# Patient Record
Sex: FEMALE | Race: WHITE
[De-identification: names, ages, dates, MRNs, and addresses within clinical notes are randomized per-mention and may not be internally consistent; named-entity substitution may affect disease eponyms.]

---

## 2017-04-14 ENCOUNTER — HOSPITAL ENCOUNTER (OUTPATIENT)
Dept: HOSPITAL 47 - RADMAMWWP | Age: 57
Discharge: HOME | End: 2017-04-14
Payer: COMMERCIAL

## 2017-04-14 DIAGNOSIS — R92.8: Primary | ICD-10-CM

## 2017-04-17 NOTE — MM
Reason for exam: follow-up at short interval from prior study.

Last mammogram was performed 6 months ago.



History:

Patient is postmenopausal.

Taking estrogen for 6 years 1 month.



Physical Findings:

Nurse did not find any significant physical abnormalities on exam.



MG Diagnostic Mammo w CAD [facility]

Bilateral CC and MLO view(s) were taken.

Prior study comparison: October 10, 2016, bilateral MG screening mammo w CAD.  

September 16, 2015, bilateral MG screening mammo w CAD.

The breast tissue is heterogeneously dense. This may lower the sensitivity of 

mammography.  No significant new findings when compared with previous films.



These results were verbally communicated with the patient and result sheet given 

to the patient on 04/14/17.





ASSESSMENT: Benign, BI-RAD 2



RECOMMENDATION:

Routine screening mammogram of both breasts in 1 year.

## 2017-10-18 ENCOUNTER — HOSPITAL ENCOUNTER (OUTPATIENT)
Dept: HOSPITAL 47 - LABWHC1 | Age: 57
Discharge: HOME | End: 2017-10-18
Attending: PSYCHIATRY & NEUROLOGY
Payer: MEDICARE

## 2017-10-18 ENCOUNTER — HOSPITAL ENCOUNTER (OUTPATIENT)
Dept: HOSPITAL 47 - WWCWWP | Age: 57
End: 2017-10-18
Payer: MEDICARE

## 2017-10-18 DIAGNOSIS — Z79.899: ICD-10-CM

## 2017-10-18 DIAGNOSIS — Z01.419: Primary | ICD-10-CM

## 2017-10-18 DIAGNOSIS — Z51.81: Primary | ICD-10-CM

## 2017-10-18 LAB
CHOLEST SERPL-MCNC: 199 MG/DL (ref ?–200)
GLUCOSE SERPL-MCNC: 121 MG/DL (ref 74–99)
HDLC SERPL-MCNC: 47 MG/DL (ref 40–60)
HEMOGLOBIN A1C: 6.4 % (ref 4.2–6.1)

## 2017-10-18 PROCEDURE — 84443 ASSAY THYROID STIM HORMONE: CPT

## 2017-10-18 PROCEDURE — 82947 ASSAY GLUCOSE BLOOD QUANT: CPT

## 2017-10-18 PROCEDURE — 80061 LIPID PANEL: CPT

## 2017-10-18 PROCEDURE — 83036 HEMOGLOBIN GLYCOSYLATED A1C: CPT

## 2017-10-18 PROCEDURE — 84146 ASSAY OF PROLACTIN: CPT

## 2017-10-18 PROCEDURE — 84439 ASSAY OF FREE THYROXINE: CPT

## 2017-10-18 PROCEDURE — 36415 COLL VENOUS BLD VENIPUNCTURE: CPT

## 2017-10-18 NOTE — [facility] - HISTORY AND PHYSICAL



CHIEF COMPLAINT:

The patient is here for her routine gynecologic exam.



HPI:

This is a 57-year-old, G3, P2-0-1-2 with an LMP of 2002.  She is status post ALCIDES, LSO

and later RSO for benign reasons.  The patient is without gynecologic complaints.



PAST MEDICAL HISTORY:

Schizoaffective disorder with bipolar component, type 2 diabetes, chronic hypertension

and elevated cholesterol.



MEDICATIONS:

1. Invega injections monthly.

2. Pravastatin 1 daily.

3. Metformin 1 daily.

4. Hydrochlorothiazide 1 daily.



ALLERGIES:

No known drug allergies.



PAST SURGICAL, GYN AND FAMILY HISTORIES:

Unchanged from the 11/01/2016 H and P.



SOCIAL HISTORY:

She quit smoking in 2015 and denies alcohol and drug use.  She has been a  since

2005 and is not sexually active.  She is not seeing anybody at this time and she is

currently unemployed.



REVIEW OF SYSTEMS:

She has lost about 22 pounds over the last year and she has done this with diet

changes.  She denies respiratory, cardiac and GI problems.



PHYSICAL EXAM:

Blood pressure 114/84, height 5 feet 5 inches, weight 242 pounds, temperature 98.7,

pulse 112.  This is a well-developed, heavyset white female, who is alert and oriented

x3, in no acute distress.

HEENT:  Within normal limits.

NECK:  Supple without mass or thyromegaly.

CHEST AND LUNGS:  Clear to auscultation.

HEART:  Mild tachycardia.

BREASTS:  Without mass or discharge.

AXILLARY:  Negative for adenopathy.

BACK:  Negative for CVA tenderness.

ABDOMEN:  Obese, soft, nontender without palpable masses.

PELVIC:  Normal external genitalia with no significant atrophy.  Vagina reveals mild

atrophy without lesions.  There is no evidence of prolapse.  Bimanual is negative for

mass or tenderness.

RECTOVAGINAL:  Negative for mass or tenderness and is negative for occult blood.

EXTREMITIES:  Nontender.



IMPRESSION:

A 57-year-old menopausal female, status post total abdominal hysterectomy and bilateral

salpingo-oophorectomy for benign reasons with normal gynecologic exam.



PLAN:

1. Pap smears have been discontinued.

2. Self-breast examination was discussed.

3. Bilateral screening mammogram will be due in 04/2018 and a slip was given to the

    patient for this.

4. Osteoporosis prevention was discussed.  We will plan on doing a bone density test

    at the time of her mammogram because of her early surgical menopause.  She does

    have an order for this.

5. I have recommended screening colonoscopy.  She states she is refusing to do this

    for personal reasons.  I have discussed the reasoning for colorectal screening and

    different alternatives.  She will consider this.

6. She will return in 1 year.





EV / NITISH: [id number] / Job#: [id number]

## 2018-05-23 ENCOUNTER — HOSPITAL ENCOUNTER (OUTPATIENT)
Dept: HOSPITAL 47 - RADMAMWWP | Age: 58
Discharge: HOME | End: 2018-05-23
Payer: MEDICARE

## 2018-05-23 DIAGNOSIS — Z78.0: ICD-10-CM

## 2018-05-23 DIAGNOSIS — Z12.31: Primary | ICD-10-CM

## 2018-05-23 PROCEDURE — 77080 DXA BONE DENSITY AXIAL: CPT

## 2018-05-23 PROCEDURE — 77067 SCR MAMMO BI INCL CAD: CPT

## 2018-05-23 NOTE — MM
Reason for exam: screening  (asymptomatic).

Last mammogram was performed 1 year and 1 month ago.



History:

Patient is postmenopausal.

Taking estrogen for 6 years 1 month.



Physical Findings:

A clinical breast exam by your physician is recommended on an annual basis and 

results should be correlated with mammographic findings.



MG Screening Mammo w CAD

Bilateral CC and MLO view(s) were taken.

Prior study comparison: April 14, 2017, bilateral MG diagnostic mammo w CAD [facility].  

October 10, 2016, bilateral MG screening mammo w CAD.

There are scattered fibroglandular densities.  There is no discrete abnormality.  

No significant changes when compared with prior studies.





ASSESSMENT: Negative, BI-RAD 1



RECOMMENDATION:

Routine screening mammogram of both breasts in 1 year.

## 2019-08-27 ENCOUNTER — HOSPITAL ENCOUNTER (OUTPATIENT)
Dept: HOSPITAL 47 - WWCWWP | Age: 59
Discharge: HOME | End: 2019-08-27
Attending: OBSTETRICS & GYNECOLOGY
Payer: MEDICARE

## 2019-08-27 VITALS — BODY MASS INDEX: 41.9 KG/M2

## 2019-08-27 VITALS
TEMPERATURE: 98.6 F | DIASTOLIC BLOOD PRESSURE: 75 MMHG | SYSTOLIC BLOOD PRESSURE: 105 MMHG | HEART RATE: 105 BPM | RESPIRATION RATE: 18 BRPM

## 2019-08-27 DIAGNOSIS — Z12.31: Primary | ICD-10-CM

## 2019-08-27 PROCEDURE — 77067 SCR MAMMO BI INCL CAD: CPT

## 2019-08-27 NOTE — P.HPOB
History of Present Illness


H&P Date: 19


Chief Complaint: The patient is here for her routine gynecologic exam and ma

mmogram.


This is a 59-year-old  with an LMP of .  The patient is status post 

ALCIDES, LSO and later RSO for benign reasons.  Patient is without gynecologic 

complaints.








Review of Systems





The patient has gained 10 pounds over the last year.  She denies respiratory, 

cardiac, or G.I. problems.





Past Medical History


Past Medical History: Diabetes Mellitus, Hyperlipidemia, Hypertension, 

Osteoarthritis (OA)


Additional Past Medical History / Comment(s): Osteoarthritis of the left hip, 

type II diabetes. PAST GYN HISTORY: She has no history of STDs.  She used ERT 

from 8188-1900.


History of Any Multi-Drug Resistant Organisms: None Reported


Past Surgical History: Cholecystectomy, Hysterectomy


Additional Past Surgical History / Comment(s): ALCIDES LSO and prior right 

oophorectomy.  Arthroscopically with torn meniscus in right knee.  right ankle 

chip fx.


Past Anesthesia/Blood Transfusion Reactions: No Reported Reaction


Past Psychological History: Bipolar, Depression, Schizoaffective Disorder


Smoking Status: Former smoker


Past Alcohol Use History: None Reported


Additional Past Alcohol Use History / Comment(s): states she smokes 1ppd refused

nicotine patch


Past Drug Use History: None Reported


Additional History: The patient quit smoking in .  She is a  since 

and is not seeing anybody at this time.  She is currently unemployed.





- Past Family History


  ** Father


Family Medical History: Myocardial Infarction (MI)


Additional Family Medical History / Comment(s): Father has  and patient does

not know cause.  Apparently mother is alive at age 81.  Patient has no sisters 

and no brothers.  Family history of bipolar.  Patient has 1 daughter and 1 son.





Medications and Allergies


                                Home Medications











 Medication  Instructions  Recorded  Confirmed  Type


 


ARIPiprazole [Abilify]  19  History


 


Atorvastatin [Lipitor]  19  History


 


Hydrochlorothiazide [Hydrodiuril]  19  History


 


metFORMIN HCL [Glucophage]  19  History








                                    Allergies











Allergy/AdvReac Type Severity Reaction Status Date / Time


 


No Known Allergies Allergy   Verified 19 13:54














Exam


                                   Vital Signs











  Temp Pulse Resp BP Pulse Ox


 


 19 13:37  98.6 F  105 H  18  105/75  91 L








                                Intake and Output











 08/19





 22:59 06:59 14:59


 


Other:   


 


  Weight   114.305 kg











Height 5'5", weight 252 pounds, BMI 41.9.





This is a well-developed well-nourished heavyset white female who is alert and 

oriented times 3 in no acute distress.


HEENT: Within normal limits.


NECK: Supple without mass or thyromegaly.


CHEST AND LUNGS: Clear to auscultation.


HEART: Regular rate and rhythm.


BREASTS: Are without mass or discharge.


AXILLARY EXAM: Negative for adenopathy.


BACK: Negative for CVA tenderness.


ABDOMEN: Soft, obese, nontender, without palpable masses.  


PELVIC EXAM: External genitalia appears normal with minimal atrophy.  Vagina 

appears normal with minimal atrophy. There is no evidence of prolapse. Bimanual 

examination is negative for mass or tenderness.


RECTAL EXAM: Rectovaginal exam is negative for mass or tenderness and is 

negative for occult blood.


EXTREMITIES: Nontender.





IMPRESSION:


1.  59-year-old menopausal female status post ALCIDES LSO with prior right 

oophorectomy done for benign reasons.


2.  Normal gynecologic exam.





PLAN: 


1.  Pap smears have been discontinued.


2.  Self breast awareness was discussed with the patient.


3.  Screening mammogram will be done today.


4. Osteoporosis prevention was discussed.  I have stressed the importance of 

adequate calcium, vitamin D and regular exercise.  Recommended amounts of 

calcium and vitamin D were also discussed.


5.  I've recommended screening colonoscopy based on her age.  She is declining 

this.  She has also discussed this with her primary care physician, Dr. Roger Pascual.


6. She was advised to return in one year for her annual well woman exam.

## 2019-08-29 NOTE — MM
Reason for exam: screening  (asymptomatic).

Last mammogram was performed 1 year and 3 months ago.



History:

Patient is postmenopausal.

Taking estrogen for 6 years 1 month.



Physical Findings:

A clinical breast exam by your physician is recommended on an annual basis and 

results should be correlated with mammographic findings.



MG Screening Mammo w CAD

Bilateral CC and MLO view(s) were taken.

Prior study comparison: May 23, 2018, bilateral MG screening mammo w CAD.  April 14, 2017, bilateral MG diagnostic mammo w CAD [facility].

There are scattered fibroglandular densities.  No significant changes when 

compared with prior studies.





ASSESSMENT: Benign, BI-RAD 2



RECOMMENDATION:

Routine screening mammogram of both breasts in 1 year.

## 2020-07-17 ENCOUNTER — HOSPITAL ENCOUNTER (OUTPATIENT)
Dept: HOSPITAL 47 - RADUSMAIN | Age: 60
Discharge: HOME | End: 2020-07-17
Attending: FAMILY MEDICINE
Payer: MEDICARE

## 2020-07-17 DIAGNOSIS — R91.8: Primary | ICD-10-CM

## 2020-07-17 DIAGNOSIS — J98.6: ICD-10-CM

## 2020-07-17 PROCEDURE — 71250 CT THORAX DX C-: CPT

## 2020-07-17 PROCEDURE — 76700 US EXAM ABDOM COMPLETE: CPT

## 2020-07-17 NOTE — CT
EXAMINATION TYPE: CT chest wo con

 

DATE OF EXAM: 7/17/2020

 

COMPARISON: None

 

HISTORY: cough

 

CT DLP: 547.2 mGycm

 

Unenhanced CT of the chest was performed with lung and mediastinal window settings submitted.  The la
ck of contrast limits evaluation of the vascular, mediastinal and parenchymal structures including th
e upper abdomen.

 

LUNGS: Basilar atelectasis or parenchymal scarring noted. Chronic elevation right hemidiaphragm. Smal
l groundglass density in the region of the lingula to reflect postinflammatory or current inflammator
y process. Correlate clinically.

 

MEDIASTINUM/JUVENAL:  Thoracic aorta is of normal caliber with limited evaluation given lack of contrast
.  The heart is not enlarged.  No evidence for mediastinal mass.  No  lymph nodes greater than 1cm.

 

UPPER ABDOMEN: Hepatic steatosis.

 

OTHER: No significant other abnormality.

 

IMPRESSION:

 

1.  Small focus of groundglass infiltrate in the region of the lingula. Correlate for inflammatory pr
ocess.

2. Basilar parenchymal scarring and chronic appearing elevation right hemidiaphragm.

## 2020-07-17 NOTE — US
EXAMINATION TYPE: US abdomen complete

 

DATE OF EXAM: 7/17/2020

 

COMPARISON: CT lung today

 

CLINICAL HISTORY: R74.8 R05. elevated LFT, on meds for HTN, Metformin, Abilify; gallbladder removed

 

EXAM MEASUREMENTS:

 

Liver Length:  15.8 cm   

Gallbladder Wall:  surgically removed  

CBD:  0.6 cm

Spleen:  11.2 cm   

Right Kidney:  12.0 x 7.5 x 5.1 cm 

Left Kidney:  10.0 x 5.3 x 5.2 cm   

 

US exam is technically limited by large body habitus.

 

Pancreas:  hyperechoic

Liver:  hyperechoic to right renal cortex, attenuated posteriorly suggests fatty liver  

Gallbladder:  Surgically absent

**Evidence for sonographic Bright's sign:  no

CBD:  wnl 

Spleen:  wnl   

Right Kidney:  solid renal mass = 6.3 x 6.5 x 4.9cm seen mid and lower pole with internal vascularity
 seen  

Left Kidney:  No hydronephrosis or masses seen   

Upper IVC:  wnl  

Abd Aorta:  wnl

 

 

IMPRESSION: 

1. Right renal mass. Additional workup with contrast CT is recommended. This is suspicious for neopla
sm until proven otherwise.

## 2020-07-27 ENCOUNTER — HOSPITAL ENCOUNTER (OUTPATIENT)
Dept: HOSPITAL 47 - RADCTMAIN | Age: 60
Discharge: HOME | End: 2020-07-27
Attending: FAMILY MEDICINE
Payer: MEDICARE

## 2020-07-27 DIAGNOSIS — N28.89: Primary | ICD-10-CM

## 2020-07-27 LAB — BUN SERPL-SCNC: 18 MG/DL (ref 7–17)

## 2020-07-27 PROCEDURE — 36415 COLL VENOUS BLD VENIPUNCTURE: CPT

## 2020-07-27 PROCEDURE — 82565 ASSAY OF CREATININE: CPT

## 2020-07-27 PROCEDURE — 84520 ASSAY OF UREA NITROGEN: CPT

## 2020-07-27 PROCEDURE — 74177 CT ABD & PELVIS W/CONTRAST: CPT

## 2020-07-28 NOTE — CT
EXAMINATION TYPE: CT abdomen pelvis w con

 

DATE OF EXAM: 7/27/2020

 

COMPARISON: 7/17/2020 ultrasound

 

INDICATION: Right kidney mass.

 

DLP: 2367 mGycm, Automated exposure control for dose reduction was used.

 

CONTRAST:  100ml mL of Isovue 300. 

                        Study performed with Oral Contrast

 

TECHNIQUE: Axial images were obtained from above the diaphragm to the pubic rami in the axial plane a
t 5 mm thick sections.  Reconstructed images are reviewed on the computer in the coronal plane.  

 

FINDINGS:

 

Limited CT sections are obtained the lung bases.  Some mild streak atelectasis is likely present at t
he right lung base..

 

CT ABDOMEN:

 

Liver: There is mild fatty infiltration liver. Some focal spurring may be along the anterior right lo
be. No suspicious masses are evident.

 

Spleen: Normal

 

Pancreas: Normal

 

Adrenal glands: The adrenal glands are normal.

 

Gallbladder: Surgically absent  

 

Kidneys: There is a 6.4 x 7.3 x 5.4 cm heterogenous mass with enhancing similar to that of the kidney
 extending from the inferior anterior pole right kidney no additional masses are evident.. No hydrone
phrosis is present.   No cysts are present.  Delayed images were obtained through the kidneys. There 
appears to be early washout of the mass inferior pole right kidney

 

Aorta: Vascular calcification is within the aorta. 

 

Inferior vena cava: Normal.

 

CT PELVIS: 

Loops of bowel within the abdomen and pelvis are normal.     There are loops of bowel which are incom
pletely distended or lack oral contrast limiting their evaluation.

 

Appendix: Not identified. No suspicious dilated tubular structures or inflammatory changes are eviden
t

 

Urinary bladder: Decompressed with limited evaluation. 

 

Genitourinary structures: Uterus is absent. Adnexal regions are unremarkable.

 

Osseous structures: No suspicious lytic or sclerotic lesions.  No expansile lesions are identified. T
here may be some fusion of sacroiliac joints. Mild facet degenerative changes within the lumbar spine
. 

 

IMPRESSIONS:

1.  6.4 x 7.3 x 5.4 cm mass inferior anterior lateral pole right kidney. Neoplasm should be considere
d until proven otherwise.

2. No suspicious areas identified to suggest metastatic disease.

## 2020-08-26 ENCOUNTER — HOSPITAL ENCOUNTER (OUTPATIENT)
Dept: HOSPITAL 47 - LABPAT | Age: 60
Discharge: HOME | End: 2020-08-26
Attending: UROLOGY
Payer: MEDICARE

## 2020-08-26 DIAGNOSIS — Z01.818: Primary | ICD-10-CM

## 2020-08-26 DIAGNOSIS — D41.01: ICD-10-CM

## 2020-08-26 LAB
ANION GAP SERPL CALC-SCNC: 12 MMOL/L
BASOPHILS # BLD AUTO: 0.1 K/UL (ref 0–0.2)
BASOPHILS NFR BLD AUTO: 1 %
BUN SERPL-SCNC: 14 MG/DL (ref 7–17)
CALCIUM SPEC-MCNC: 9.7 MG/DL (ref 8.4–10.2)
CHLORIDE SERPL-SCNC: 93 MMOL/L (ref 98–107)
CO2 SERPL-SCNC: 33 MMOL/L (ref 22–30)
EOSINOPHIL # BLD AUTO: 0.1 K/UL (ref 0–0.7)
EOSINOPHIL NFR BLD AUTO: 2 %
ERYTHROCYTE [DISTWIDTH] IN BLOOD BY AUTOMATED COUNT: 5.31 M/UL (ref 3.8–5.4)
ERYTHROCYTE [DISTWIDTH] IN BLOOD: 14 % (ref 11.5–15.5)
GLUCOSE SERPL-MCNC: 266 MG/DL (ref 74–99)
HCT VFR BLD AUTO: 50.2 % (ref 34–46)
HGB BLD-MCNC: 16 GM/DL (ref 11.4–16)
LYMPHOCYTES # SPEC AUTO: 1.7 K/UL (ref 1–4.8)
LYMPHOCYTES NFR SPEC AUTO: 19 %
MCH RBC QN AUTO: 30.1 PG (ref 25–35)
MCHC RBC AUTO-ENTMCNC: 31.9 G/DL (ref 31–37)
MCV RBC AUTO: 94.4 FL (ref 80–100)
MONOCYTES # BLD AUTO: 0.3 K/UL (ref 0–1)
MONOCYTES NFR BLD AUTO: 3 %
NEUTROPHILS # BLD AUTO: 6.6 K/UL (ref 1.3–7.7)
NEUTROPHILS NFR BLD AUTO: 74 %
PLATELET # BLD AUTO: 284 K/UL (ref 150–450)
POTASSIUM SERPL-SCNC: 4 MMOL/L (ref 3.5–5.1)
SODIUM SERPL-SCNC: 138 MMOL/L (ref 137–145)
WBC # BLD AUTO: 8.9 K/UL (ref 3.8–10.6)

## 2020-08-26 PROCEDURE — 86850 RBC ANTIBODY SCREEN: CPT

## 2020-08-26 PROCEDURE — 36415 COLL VENOUS BLD VENIPUNCTURE: CPT

## 2020-08-26 PROCEDURE — 85025 COMPLETE CBC W/AUTO DIFF WBC: CPT

## 2020-08-26 PROCEDURE — 80048 BASIC METABOLIC PNL TOTAL CA: CPT

## 2020-08-26 PROCEDURE — 86901 BLOOD TYPING SEROLOGIC RH(D): CPT

## 2020-08-26 PROCEDURE — 86900 BLOOD TYPING SEROLOGIC ABO: CPT

## 2020-08-27 ENCOUNTER — HOSPITAL ENCOUNTER (OUTPATIENT)
Dept: HOSPITAL 47 - LABWHC1 | Age: 60
Discharge: HOME | End: 2020-08-27
Attending: UROLOGY
Payer: MEDICARE

## 2020-08-27 DIAGNOSIS — Z01.818: Primary | ICD-10-CM

## 2020-08-27 DIAGNOSIS — D41.01: ICD-10-CM

## 2020-08-27 PROCEDURE — 87086 URINE CULTURE/COLONY COUNT: CPT

## 2020-08-27 NOTE — P.HPIHPCON
History of Present Illness


H&P Date: 20


Chief Complaint: Right-sided renal mass





Ms Cortez 60-year-old female with history of 7.3 cm right-sided renal mass, 

concerning for RCC.  I discussed with her given the finding on CT scan I 

recommend she undergoes surgical intervention.  Discussed with her the option of

biopsy.  I discussed with her given the size of the tumor the option of doing a 

radical nephrectomy either open or robotically.  I discussed the risk and 

benefit of each approach.  She agreed to proceed with right-sided radical 

nephrectomy robotically.  I discussed with her the risk which includes but not 

limited to injury to the liver bowel blood vessels.  I also discussed the 

potential of benign pathology.  Discussed the risk of infection and bleeding.  I

also discussed the potential that she'll be on dialysis postoperatively given 

that she'll have a solitary kidney.  Also discussed the risk from anesthesia.  

She understood all the risk and agreed to proceed with robotic-assisted radical 

nephrectomy on the right


Consent for Procedure: 


I have explained the operation/procedure to the patient, including the risks, 

benefits, side effects, alternative therapies (including not receiving the 

proposed treatment or service), the likelihood of the patient achieving his/her 

goals, and potential recuperation problems for the procedure/sedation/analgesia,

as well as any blood products, if indicated. I also explained to the patient the

risks, benefits and side effects of the alternatives, as well as the risks 

related to not receiving the proposed procedure, care, treatment, or services.








Past Medical History


Past Medical History: Diabetes Mellitus, Hyperlipidemia, Hypertension, 

Osteoarthritis (OA)


Additional Past Medical History / Comment(s): Osteoarthritis of the left hip, 

type II diabetes. PAST GYN HISTORY: She has no history of STDs.  She used ERT 

from 4499-6996.


History of Any Multi-Drug Resistant Organisms: None Reported


Past Surgical History: Cholecystectomy, Hysterectomy


Additional Past Surgical History / Comment(s): ALCIDES LSO and prior right 

oophorectomy.  Arthroscopically with torn meniscus in right knee.  right ankle 

chip fx.


Past Anesthesia/Blood Transfusion Reactions: No Reported Reaction


Past Psychological History: Bipolar, Depression, Schizoaffective Disorder


Past Alcohol Use History: None Reported


Additional Past Alcohol Use History / Comment(s): states she smokes 1ppd refused

nicotine patch


Past Drug Use History: None Reported





- Past Family History


  ** Father


Family Medical History: Myocardial Infarction (MI)


Additional Family Medical History / Comment(s): Father has  and patient does

not know cause.  Apparently mother is alive at age 81.  Patient has no sisters 

and no brothers.  Family history of bipolar.  Patient has 1 daughter and 1 son.





Medications and Allergies


                                Home Medications











 Medication  Instructions  Recorded  Confirmed  Type


 


ARIPiprazole [Abilify]  19  History


 


Atorvastatin [Lipitor]  19  History


 


hydroCHLOROthiazide [Hydrodiuril]  19  History


 


metFORMIN HCL [Glucophage]  19  History








                                    Allergies











Allergy/AdvReac Type Severity Reaction Status Date / Time


 


No Known Allergies Allergy   Verified 19 13:54














Surgical - Exam





- General


well developed, well nourished, no distress, no pain





- Respiratory


normal expansion, normal respiratory effort





- Abdomen


Abdomen: soft, non tender





- Psychiatric


oriented to time, oriented to person, oriented to place





Assessment and Plan


Assessment: 





60-year-old female with history of right-sided renal mass


Plan: 





All or for robotic-assisted right radical nephrectomy

## 2020-08-31 ENCOUNTER — HOSPITAL ENCOUNTER (OUTPATIENT)
Dept: HOSPITAL 47 - LABPAT | Age: 60
Discharge: HOME | End: 2020-08-31
Attending: UROLOGY
Payer: MEDICARE

## 2020-08-31 DIAGNOSIS — I10: Primary | ICD-10-CM

## 2020-08-31 PROCEDURE — 93005 ELECTROCARDIOGRAM TRACING: CPT

## 2020-09-03 ENCOUNTER — HOSPITAL ENCOUNTER (INPATIENT)
Dept: HOSPITAL 47 - 2ORMAIN | Age: 60
LOS: 3 days | Discharge: HOME | DRG: 656 | End: 2020-09-06
Attending: UROLOGY | Admitting: UROLOGY
Payer: MEDICARE

## 2020-09-03 VITALS — BODY MASS INDEX: 45.2 KG/M2

## 2020-09-03 DIAGNOSIS — E11.9: ICD-10-CM

## 2020-09-03 DIAGNOSIS — Z90.721: ICD-10-CM

## 2020-09-03 DIAGNOSIS — C64.1: Primary | ICD-10-CM

## 2020-09-03 DIAGNOSIS — Z79.84: ICD-10-CM

## 2020-09-03 DIAGNOSIS — J96.02: ICD-10-CM

## 2020-09-03 DIAGNOSIS — Z90.710: ICD-10-CM

## 2020-09-03 DIAGNOSIS — F25.9: ICD-10-CM

## 2020-09-03 DIAGNOSIS — E66.2: ICD-10-CM

## 2020-09-03 DIAGNOSIS — I10: ICD-10-CM

## 2020-09-03 DIAGNOSIS — Z87.891: ICD-10-CM

## 2020-09-03 DIAGNOSIS — Z79.899: ICD-10-CM

## 2020-09-03 DIAGNOSIS — F31.30: ICD-10-CM

## 2020-09-03 DIAGNOSIS — E78.5: ICD-10-CM

## 2020-09-03 DIAGNOSIS — Z82.49: ICD-10-CM

## 2020-09-03 DIAGNOSIS — J98.11: ICD-10-CM

## 2020-09-03 DIAGNOSIS — M16.12: ICD-10-CM

## 2020-09-03 DIAGNOSIS — J96.01: ICD-10-CM

## 2020-09-03 LAB
ALBUMIN SERPL-MCNC: 4 G/DL (ref 3.5–5)
ALP SERPL-CCNC: 117 U/L (ref 38–126)
ALT SERPL-CCNC: 138 U/L (ref 4–34)
ANION GAP SERPL CALC-SCNC: 11 MMOL/L
AST SERPL-CCNC: 235 U/L (ref 14–36)
BASOPHILS # BLD AUTO: 0 K/UL (ref 0–0.2)
BASOPHILS NFR BLD AUTO: 0 %
BUN SERPL-SCNC: 15 MG/DL (ref 7–17)
CALCIUM SPEC-MCNC: 8.8 MG/DL (ref 8.4–10.2)
CHLORIDE SERPL-SCNC: 97 MMOL/L (ref 98–107)
CO2 BLDA-SCNC: 18 MMOL/L (ref 19–24)
CO2 SERPL-SCNC: 28 MMOL/L (ref 22–30)
EOSINOPHIL # BLD AUTO: 0 K/UL (ref 0–0.7)
EOSINOPHIL NFR BLD AUTO: 0 %
ERYTHROCYTE [DISTWIDTH] IN BLOOD BY AUTOMATED COUNT: 4.81 M/UL (ref 3.8–5.4)
ERYTHROCYTE [DISTWIDTH] IN BLOOD: 14 % (ref 11.5–15.5)
GLUCOSE BLD-MCNC: 279 MG/DL (ref 75–99)
GLUCOSE BLD-MCNC: 285 MG/DL (ref 75–99)
GLUCOSE BLD-MCNC: 297 MG/DL (ref 75–99)
GLUCOSE BLD-MCNC: 306 MG/DL (ref 75–99)
GLUCOSE BLD-MCNC: 308 MG/DL (ref 75–99)
GLUCOSE BLD-MCNC: 314 MG/DL (ref 75–99)
GLUCOSE BLD-MCNC: 324 MG/DL (ref 75–99)
GLUCOSE SERPL-MCNC: 331 MG/DL (ref 74–99)
HCO3 BLDA-SCNC: 30 MMOL/L (ref 21–25)
HCT VFR BLD AUTO: 46.1 % (ref 34–46)
HGB BLD-MCNC: 14.3 GM/DL (ref 11.4–16)
LYMPHOCYTES # SPEC AUTO: 0.9 K/UL (ref 1–4.8)
LYMPHOCYTES NFR SPEC AUTO: 5 %
MAGNESIUM SPEC-SCNC: 1.5 MG/DL (ref 1.6–2.3)
MCH RBC QN AUTO: 29.8 PG (ref 25–35)
MCHC RBC AUTO-ENTMCNC: 31.1 G/DL (ref 31–37)
MCV RBC AUTO: 95.9 FL (ref 80–100)
MONOCYTES # BLD AUTO: 0.3 K/UL (ref 0–1)
MONOCYTES NFR BLD AUTO: 2 %
NEUTROPHILS # BLD AUTO: 15.7 K/UL (ref 1.3–7.7)
NEUTROPHILS NFR BLD AUTO: 93 %
PCO2 BLDA: 57 MMHG (ref 35–45)
PH BLDA: 7.34 [PH] (ref 7.35–7.45)
PLATELET # BLD AUTO: 299 K/UL (ref 150–450)
PO2 BLDA: 83 MMHG (ref 83–108)
POTASSIUM SERPL-SCNC: 4 MMOL/L (ref 3.5–5.1)
PROT SERPL-MCNC: 6.5 G/DL (ref 6.3–8.2)
SODIUM SERPL-SCNC: 136 MMOL/L (ref 137–145)
WBC # BLD AUTO: 17 K/UL (ref 3.8–10.6)

## 2020-09-03 PROCEDURE — 8E0W4CZ ROBOTIC ASSISTED PROCEDURE OF TRUNK REGION, PERCUTANEOUS ENDOSCOPIC APPROACH: ICD-10-PCS

## 2020-09-03 PROCEDURE — 94660 CPAP INITIATION&MGMT: CPT

## 2020-09-03 PROCEDURE — 85027 COMPLETE CBC AUTOMATED: CPT

## 2020-09-03 PROCEDURE — 94002 VENT MGMT INPAT INIT DAY: CPT

## 2020-09-03 PROCEDURE — 84100 ASSAY OF PHOSPHORUS: CPT

## 2020-09-03 PROCEDURE — 88307 TISSUE EXAM BY PATHOLOGIST: CPT

## 2020-09-03 PROCEDURE — 5A09457 ASSISTANCE WITH RESPIRATORY VENTILATION, 24-96 CONSECUTIVE HOURS, CONTINUOUS POSITIVE AIRWAY PRESSURE: ICD-10-PCS

## 2020-09-03 PROCEDURE — 94640 AIRWAY INHALATION TREATMENT: CPT

## 2020-09-03 PROCEDURE — 86850 RBC ANTIBODY SCREEN: CPT

## 2020-09-03 PROCEDURE — 64488 TAP BLOCK BI INJECTION: CPT

## 2020-09-03 PROCEDURE — 86900 BLOOD TYPING SEROLOGIC ABO: CPT

## 2020-09-03 PROCEDURE — 85025 COMPLETE CBC W/AUTO DIFF WBC: CPT

## 2020-09-03 PROCEDURE — 86901 BLOOD TYPING SEROLOGIC RH(D): CPT

## 2020-09-03 PROCEDURE — 88305 TISSUE EXAM BY PATHOLOGIST: CPT

## 2020-09-03 PROCEDURE — 83036 HEMOGLOBIN GLYCOSYLATED A1C: CPT

## 2020-09-03 PROCEDURE — 0TT04ZZ RESECTION OF RIGHT KIDNEY, PERCUTANEOUS ENDOSCOPIC APPROACH: ICD-10-PCS

## 2020-09-03 PROCEDURE — 71045 X-RAY EXAM CHEST 1 VIEW: CPT

## 2020-09-03 PROCEDURE — 80048 BASIC METABOLIC PNL TOTAL CA: CPT

## 2020-09-03 PROCEDURE — 80053 COMPREHEN METABOLIC PANEL: CPT

## 2020-09-03 PROCEDURE — 36600 WITHDRAWAL OF ARTERIAL BLOOD: CPT

## 2020-09-03 PROCEDURE — 82805 BLOOD GASES W/O2 SATURATION: CPT

## 2020-09-03 PROCEDURE — 83735 ASSAY OF MAGNESIUM: CPT

## 2020-09-03 RX ADMIN — ATORVASTATIN CALCIUM SCH MG: 20 TABLET, FILM COATED ORAL at 22:11

## 2020-09-03 RX ADMIN — FENTANYL CITRATE ONE MCG: 50 INJECTION, SOLUTION INTRAMUSCULAR; INTRAVENOUS at 12:07

## 2020-09-03 RX ADMIN — HEPARIN SODIUM SCH: 5000 INJECTION, SOLUTION INTRAVENOUS; SUBCUTANEOUS at 18:23

## 2020-09-03 RX ADMIN — POTASSIUM CHLORIDE SCH MLS: 14.9 INJECTION, SOLUTION INTRAVENOUS at 11:42

## 2020-09-03 RX ADMIN — FENTANYL CITRATE ONE MCG: 50 INJECTION, SOLUTION INTRAMUSCULAR; INTRAVENOUS at 17:30

## 2020-09-03 RX ADMIN — FENTANYL CITRATE ONE MCG: 50 INJECTION, SOLUTION INTRAMUSCULAR; INTRAVENOUS at 17:15

## 2020-09-03 RX ADMIN — FENTANYL CITRATE ONE MCG: 50 INJECTION, SOLUTION INTRAMUSCULAR; INTRAVENOUS at 17:05

## 2020-09-03 RX ADMIN — FENTANYL CITRATE ONE MCG: 50 INJECTION, SOLUTION INTRAMUSCULAR; INTRAVENOUS at 17:45

## 2020-09-03 RX ADMIN — CEFAZOLIN SCH MLS/HR: 330 INJECTION, POWDER, FOR SOLUTION INTRAMUSCULAR; INTRAVENOUS at 19:08

## 2020-09-03 RX ADMIN — POTASSIUM CHLORIDE SCH MLS: 14.9 INJECTION, SOLUTION INTRAVENOUS at 11:45

## 2020-09-03 NOTE — P.OP
Date of Procedure: 09/03/20


Preoperative Diagnosis: 


right renal mass


Postoperative Diagnosis: 


same


Procedure(s) Performed: 


robotic-assisted right sided radical nephrectomy, lysis of adhesions


Implants: 


none


Anesthesia: YVETTE


Surgeon: Brando Kenney


Assistant #1: Hesham Roe


Estimated Blood Loss (ml): 100


Pathology: other (right kidney)


Condition: stable


Disposition: PACU


Indications for Procedure: 





Ms Cortez 60-year-old female with history of 7.3 cm right-sided renal mass, 

concerning for RCC.  I discussed with her given the finding on CT scan I 

recommend she undergoes surgical intervention.  Discussed with her the option of

biopsy.  I discussed with her given the size of the tumor the option of doing a 

radical nephrectomy either open or robotically.  I discussed the risk and 

benefit of each approach.  She agreed to proceed with right-sided radical 

nephrectomy robotically.  I discussed with her the risk which includes but not 

limited to injury to the liver bowel blood vessels.  I also discussed the 

potential of benign pathology.  Discussed the risk of infection and bleeding.  I

also discussed the potential that she'll be on dialysis postoperatively given 

that she'll have a solitary kidney.  Also discussed the risk from anesthesia.  

She understood all the risk and agreed to proceed with robotic-assisted radical 

nephrectomy on the right


Operative Findings: 


right-sided renal mass, significant amount of fat surrounding the kidney, 

adhesions along the right quadrant


Description of Procedure: 


The patient was taken to the operating room . General anesthesia was induced. 

She was prepped and draped in sterile fashion, and was placed in modified flank 

position . All pressure points were padded. The abdominal insufflation was 

achieved with the Veress needle. A 8 mm camera port was placed. Robotic trocars 

and assistant ports were placed under direct vision. it was noted along the 

liver there to be there to be a superficial Veress needle injury, but there was 

no bleeding noticed. lysis of adhesion was performed, omentum was stuck to the 

liver and lysis of adhesion was performed in order to mobilize the liver to 

allow for a liver retractor placement.a 5 mm liver retractor was placed. The 

robot was docked into place. she had significant adhesion along the right 

quadrant and this was taken down robotically. The colon was mobilized medially 

by incising along the white line of Toldt. Next the duodenum was kocherized.  

patient had significant amount of fat around the kidney which made exposing the 

vena cava very challenging.  At this time this 2 stitches were placed in to the 

kidney fat and tagged on to the abdominal wallAt this time the vena cava was 

exposed.    Next the ureter was retracted anteriorly off the psoas muscle. 

Dissection proceeded cranially towards the renal hilum.. The upper pole 

attachments were dissected. Care was taken to safely mobilize the kidney free of

all visceral structures.The renal vessels were dissected.  At this point the 

renal vessels were exposed. Next the renal  hilum was ligated using the vascular

stapler. The adrenal gland was mobilized. Lateral and remaining kidney 

attachments were released.  The ureter was dissected further distally.  The 

ureter was ligated using the vascular stapler.   The kidney was placed in an 

Endo Catch bag. Hemostatic agent were applied to the surgical field, and into 

the area of liver injury.  The robot was then de-docked and the specimen was 

then removed by extending the assistant port. of note patient had a very weak 

fascia Fascia was closed with one layer using #1 Stratafix. Skin was closed with

subcuticular sutures and dermabond. The patient was awoken from general 

anesthesia in stable condition.


Please refer to the final pathology report for final diagnosis

## 2020-09-03 NOTE — XR
EXAMINATION: XR chest 1V portable

DATE AND TIME:  9/3/2020 5:53 PM

 

CLINICAL INDICATION: PHH; Hematuria

 

TECHNIQUE: AP portable upright

 

COMPARISON: 5/13/2015

 

FINDINGS: The overlying soft tissues are prominent.

 

The hemidiaphragms are elevated consistent with low lung inflation at the moment of x-ray exposure.

 

There appears to be a mild reticular pattern of increased density throughout the lungs bilaterally si
lhouetting the pulmonary vasculature to a mild degree. This can correlate with a clinical diagnosis o
f relatively mild interstitial phase pulmonary edema, presumably cardiogenic given the moderately-enl
arged cardiac silhouette.

 

The pleural spaces are negative.

The skeletal structures and soft tissues are negative for acute findings.

 

IMPRESSION:

Low lung volumes noted. 

 

Suspect interstitial phase pulmonary edema.

## 2020-09-03 NOTE — P.ANPRN
Procedure Note - Anesthesia





- Nerve Block Performed


  ** Bilateral Transversus Abdominis Single


Time Out Performed: Yes (1207)


Date of Procedure: 09/03/20


Procedure Start Time: 12:08


Procedure Stop Time: 12:13


Location of Patient: PreOp


Indication: Acute Post-Operative Pain, Requested by Surgeon


Specifically requested for management of pain by : Brando Kenney


Sedation Type: Sedate with meaningful contact maintained


Preparation: Sterile Prep


Position: Supine


Catheter: None


Needle Types: Pajunk


Needle Gauge: 21


Ultrasound used to visualize needle placement: Yes


Ultrasound used to observe medication spread: Yes


Injectate: 0.5% Ropivacaine (see comment for volume) (30CC 15CC EACH SIDE)


Blood Aspirated: No


Pain Paresthesia on Injection Noted: No


Resistance on Injection: Normal


Image Stored and Saved: Yes


Events: Uneventful and Well Tolerated

## 2020-09-04 LAB
ANION GAP SERPL CALC-SCNC: 5 MMOL/L
BASOPHILS # BLD AUTO: 0 K/UL (ref 0–0.2)
BASOPHILS NFR BLD AUTO: 0 %
BUN SERPL-SCNC: 15 MG/DL (ref 7–17)
CALCIUM SPEC-MCNC: 8 MG/DL (ref 8.4–10.2)
CHLORIDE SERPL-SCNC: 100 MMOL/L (ref 98–107)
CO2 SERPL-SCNC: 32 MMOL/L (ref 22–30)
EOSINOPHIL # BLD AUTO: 0 K/UL (ref 0–0.7)
EOSINOPHIL NFR BLD AUTO: 0 %
ERYTHROCYTE [DISTWIDTH] IN BLOOD BY AUTOMATED COUNT: 4.27 M/UL (ref 3.8–5.4)
ERYTHROCYTE [DISTWIDTH] IN BLOOD: 14.4 % (ref 11.5–15.5)
GLUCOSE BLD-MCNC: 173 MG/DL (ref 75–99)
GLUCOSE BLD-MCNC: 220 MG/DL (ref 75–99)
GLUCOSE BLD-MCNC: 229 MG/DL (ref 75–99)
GLUCOSE BLD-MCNC: 242 MG/DL (ref 75–99)
GLUCOSE BLD-MCNC: 257 MG/DL (ref 75–99)
GLUCOSE SERPL-MCNC: 178 MG/DL (ref 74–99)
HBA1C MFR BLD: 11.2 % (ref 4–6)
HCT VFR BLD AUTO: 40.7 % (ref 34–46)
HGB BLD-MCNC: 12.6 GM/DL (ref 11.4–16)
LYMPHOCYTES # SPEC AUTO: 1.2 K/UL (ref 1–4.8)
LYMPHOCYTES NFR SPEC AUTO: 12 %
MCH RBC QN AUTO: 29.6 PG (ref 25–35)
MCHC RBC AUTO-ENTMCNC: 31 G/DL (ref 31–37)
MCV RBC AUTO: 95.4 FL (ref 80–100)
MONOCYTES # BLD AUTO: 0.4 K/UL (ref 0–1)
MONOCYTES NFR BLD AUTO: 4 %
NEUTROPHILS # BLD AUTO: 7.7 K/UL (ref 1.3–7.7)
NEUTROPHILS NFR BLD AUTO: 82 %
PLATELET # BLD AUTO: 250 K/UL (ref 150–450)
POTASSIUM SERPL-SCNC: 3.9 MMOL/L (ref 3.5–5.1)
SODIUM SERPL-SCNC: 137 MMOL/L (ref 137–145)
WBC # BLD AUTO: 9.4 K/UL (ref 3.8–10.6)

## 2020-09-04 RX ADMIN — HEPARIN SODIUM SCH UNIT: 5000 INJECTION, SOLUTION INTRAVENOUS; SUBCUTANEOUS at 23:10

## 2020-09-04 RX ADMIN — CEFAZOLIN SCH MLS/HR: 330 INJECTION, POWDER, FOR SOLUTION INTRAMUSCULAR; INTRAVENOUS at 10:47

## 2020-09-04 RX ADMIN — HEPARIN SODIUM SCH UNIT: 5000 INJECTION, SOLUTION INTRAVENOUS; SUBCUTANEOUS at 16:20

## 2020-09-04 RX ADMIN — CEFAZOLIN SCH MLS/HR: 330 INJECTION, POWDER, FOR SOLUTION INTRAMUSCULAR; INTRAVENOUS at 03:48

## 2020-09-04 RX ADMIN — INSULIN ASPART SCH UNIT: 100 INJECTION, SOLUTION INTRAVENOUS; SUBCUTANEOUS at 20:45

## 2020-09-04 RX ADMIN — INSULIN DETEMIR SCH UNIT: 100 INJECTION, SOLUTION SUBCUTANEOUS at 17:21

## 2020-09-04 RX ADMIN — INSULIN ASPART SCH UNIT: 100 INJECTION, SOLUTION INTRAVENOUS; SUBCUTANEOUS at 11:59

## 2020-09-04 RX ADMIN — GLIMEPIRIDE SCH MG: 4 TABLET ORAL at 09:48

## 2020-09-04 RX ADMIN — IPRATROPIUM BROMIDE AND ALBUTEROL SULFATE SCH ML: .5; 3 SOLUTION RESPIRATORY (INHALATION) at 15:29

## 2020-09-04 RX ADMIN — INSULIN ASPART SCH UNIT: 100 INJECTION, SOLUTION INTRAVENOUS; SUBCUTANEOUS at 00:36

## 2020-09-04 RX ADMIN — MAGNESIUM SULFATE IN DEXTROSE SCH MLS/HR: 10 INJECTION, SOLUTION INTRAVENOUS at 10:45

## 2020-09-04 RX ADMIN — HEPARIN SODIUM SCH UNIT: 5000 INJECTION, SOLUTION INTRAVENOUS; SUBCUTANEOUS at 00:36

## 2020-09-04 RX ADMIN — IPRATROPIUM BROMIDE AND ALBUTEROL SULFATE SCH ML: .5; 3 SOLUTION RESPIRATORY (INHALATION) at 19:26

## 2020-09-04 RX ADMIN — MAGNESIUM SULFATE IN DEXTROSE SCH MLS/HR: 10 INJECTION, SOLUTION INTRAVENOUS at 09:23

## 2020-09-04 RX ADMIN — INSULIN ASPART SCH UNIT: 100 INJECTION, SOLUTION INTRAVENOUS; SUBCUTANEOUS at 16:58

## 2020-09-04 RX ADMIN — HEPARIN SODIUM SCH UNIT: 5000 INJECTION, SOLUTION INTRAVENOUS; SUBCUTANEOUS at 09:24

## 2020-09-04 RX ADMIN — INSULIN ASPART SCH UNIT: 100 INJECTION, SOLUTION INTRAVENOUS; SUBCUTANEOUS at 07:48

## 2020-09-04 RX ADMIN — IPRATROPIUM BROMIDE AND ALBUTEROL SULFATE SCH ML: .5; 3 SOLUTION RESPIRATORY (INHALATION) at 23:13

## 2020-09-04 RX ADMIN — ATORVASTATIN CALCIUM SCH MG: 20 TABLET, FILM COATED ORAL at 20:48

## 2020-09-04 NOTE — P.PN
Subjective


Progress Note Date: 09/04/20


Principal diagnosis: 





Right renal mass





Postoperative day #1 status post right radical nephrectomy, patient admitted to 

the ICU postoperatively due to BiPAP requirement.  This am on evaluation she is 

on 3 L of oxygen, no respiratory distress.  Her pain is controlled she is 

tolerating a diet has been out of bed into chair





Objective





- Vital Signs


Vital signs: 


                                   Vital Signs











Temp  98.0 F   09/04/20 16:00


 


Pulse  102 H  09/04/20 16:00


 


Resp  20   09/04/20 16:00


 


BP  118/61   09/04/20 16:00


 


Pulse Ox  89 L  09/04/20 16:00








                                 Intake & Output











 09/03/20 09/04/20 09/04/20





 18:59 06:59 18:59


 


Intake Total 2275 1475 1450


 


Output Total 160 585 405


 


Balance 2115 890 1045


 


Weight 118.7 kg 121.8 kg 


 


Intake:   


 


  IV 2275 1375 1450


 


    Magnesium Sulfate-D5w Pmx   200





    1 gm In Dextrose/Water 1   





    100ml.bag @ 100 mls/hr   





    IVPB Q1H [facility] Rx#:   





    [id number]   


 


    Sodium Chloride 0.9% 1, 125 1375 1250





    000 ml @ 125 mls/hr IV .   





    Q8H [facility] Rx#:[id number]   


 


  Oral  100 


 


Output:   


 


  Urine 110 585 405


 


  Estimated Blood Loss 50  


 


Other:   


 


  Voiding Method  Indwelling Catheter Indwelling Catheter


 


  # Voids   0














- Constitutional


General appearance: Present: no acute distress





- Gastrointestinal


General gastrointestinal: Present: soft.  Absent: distended, rigid





- Integumentary


Integumentary Comment(s): 





Incision: CDI





- Psychiatric


Psychiatric: Present: A&O x's 3





- Labs


CBC & Chem 7: 


                                 09/04/20 05:25





                                 09/04/20 05:25


Labs: 


                  Abnormal Lab Results - Last 24 Hours (Table)











  09/03/20 09/03/20 09/03/20 Range/Units





  18:00 18:03 18:03 


 


WBC   17.0 H   (3.8-10.6)  k/uL


 


Hct   46.1 H   (34.0-46.0)  %


 


Neutrophils #   15.7 H   (1.3-7.7)  k/uL


 


Lymphocytes #   0.9 L   (1.0-4.8)  k/uL


 


ABG pH     (7.35-7.45)  


 


ABG pCO2     (35-45)  mmHg


 


ABG HCO3     (21-25)  mmol/L


 


ABG Total CO2     (19-24)  mmol/L


 


ABG O2 Saturation     (94-97)  %


 


Sodium    136 L  (137-145)  mmol/L


 


Chloride    97 L  ()  mmol/L


 


Carbon Dioxide     (22-30)  mmol/L


 


Creatinine    1.10 H  (0.52-1.04)  mg/dL


 


Glucose    331 H  (74-99)  mg/dL


 


POC Glucose (mg/dL)  314 H    (75-99)  mg/dL


 


Hemoglobin A1c     (4.0-6.0)  %


 


Calcium     (8.4-10.2)  mg/dL


 


Magnesium    1.5 L  (1.6-2.3)  mg/dL


 


AST    235 H  (14-36)  U/L


 


ALT    138 H  (4-34)  U/L














  09/03/20 09/03/20 09/03/20 Range/Units





  18:23 19:06 20:34 


 


WBC     (3.8-10.6)  k/uL


 


Hct     (34.0-46.0)  %


 


Neutrophils #     (1.3-7.7)  k/uL


 


Lymphocytes #     (1.0-4.8)  k/uL


 


ABG pH  7.34 L    (7.35-7.45)  


 


ABG pCO2  57 H    (35-45)  mmHg


 


ABG HCO3  30 H    (21-25)  mmol/L


 


ABG Total CO2  18 L    (19-24)  mmol/L


 


ABG O2 Saturation  92.0 L    (94-97)  %


 


Sodium     (137-145)  mmol/L


 


Chloride     ()  mmol/L


 


Carbon Dioxide     (22-30)  mmol/L


 


Creatinine     (0.52-1.04)  mg/dL


 


Glucose     (74-99)  mg/dL


 


POC Glucose (mg/dL)   297 H  279 H  (75-99)  mg/dL


 


Hemoglobin A1c     (4.0-6.0)  %


 


Calcium     (8.4-10.2)  mg/dL


 


Magnesium     (1.6-2.3)  mg/dL


 


AST     (14-36)  U/L


 


ALT     (4-34)  U/L














  09/04/20 09/04/20 09/04/20 Range/Units





  00:30 05:25 05:25 


 


WBC     (3.8-10.6)  k/uL


 


Hct     (34.0-46.0)  %


 


Neutrophils #     (1.3-7.7)  k/uL


 


Lymphocytes #     (1.0-4.8)  k/uL


 


ABG pH     (7.35-7.45)  


 


ABG pCO2     (35-45)  mmHg


 


ABG HCO3     (21-25)  mmol/L


 


ABG Total CO2     (19-24)  mmol/L


 


ABG O2 Saturation     (94-97)  %


 


Sodium     (137-145)  mmol/L


 


Chloride     ()  mmol/L


 


Carbon Dioxide   32 H   (22-30)  mmol/L


 


Creatinine   1.15 H   (0.52-1.04)  mg/dL


 


Glucose   178 H   (74-99)  mg/dL


 


POC Glucose (mg/dL)  220 H    (75-99)  mg/dL


 


Hemoglobin A1c    11.2 H  (4.0-6.0)  %


 


Calcium   8.0 L   (8.4-10.2)  mg/dL


 


Magnesium     (1.6-2.3)  mg/dL


 


AST     (14-36)  U/L


 


ALT     (4-34)  U/L














  09/04/20 09/04/20 09/04/20 Range/Units





  05:25 07:21 11:56 


 


WBC     (3.8-10.6)  k/uL


 


Hct     (34.0-46.0)  %


 


Neutrophils #     (1.3-7.7)  k/uL


 


Lymphocytes #     (1.0-4.8)  k/uL


 


ABG pH     (7.35-7.45)  


 


ABG pCO2     (35-45)  mmHg


 


ABG HCO3     (21-25)  mmol/L


 


ABG Total CO2     (19-24)  mmol/L


 


ABG O2 Saturation     (94-97)  %


 


Sodium     (137-145)  mmol/L


 


Chloride     ()  mmol/L


 


Carbon Dioxide     (22-30)  mmol/L


 


Creatinine     (0.52-1.04)  mg/dL


 


Glucose     (74-99)  mg/dL


 


POC Glucose (mg/dL)   173 H  257 H  (75-99)  mg/dL


 


Hemoglobin A1c     (4.0-6.0)  %


 


Calcium     (8.4-10.2)  mg/dL


 


Magnesium  1.5 L    (1.6-2.3)  mg/dL


 


AST     (14-36)  U/L


 


ALT     (4-34)  U/L














  09/04/20 Range/Units





  16:46 


 


WBC   (3.8-10.6)  k/uL


 


Hct   (34.0-46.0)  %


 


Neutrophils #   (1.3-7.7)  k/uL


 


Lymphocytes #   (1.0-4.8)  k/uL


 


ABG pH   (7.35-7.45)  


 


ABG pCO2   (35-45)  mmHg


 


ABG HCO3   (21-25)  mmol/L


 


ABG Total CO2   (19-24)  mmol/L


 


ABG O2 Saturation   (94-97)  %


 


Sodium   (137-145)  mmol/L


 


Chloride   ()  mmol/L


 


Carbon Dioxide   (22-30)  mmol/L


 


Creatinine   (0.52-1.04)  mg/dL


 


Glucose   (74-99)  mg/dL


 


POC Glucose (mg/dL)  229 H  (75-99)  mg/dL


 


Hemoglobin A1c   (4.0-6.0)  %


 


Calcium   (8.4-10.2)  mg/dL


 


Magnesium   (1.6-2.3)  mg/dL


 


AST   (14-36)  U/L


 


ALT   (4-34)  U/L














Assessment and Plan


Assessment: 





S/P right radical nephrectomy, admitted to ICU postoperatively due to BiPAP 

requirement.  On 3 L oxygen


Plan: 





-Pain control


-Ambulate


-D/C Earl


-repeat am labs 


-Ok for transfer to floor from urology standpoint

## 2020-09-04 NOTE — P.CNPUL
History of Present Illness


Consult date: 20


Requesting physician: Brando Kenney


Reason for consult: other (Postoperative hypercapnic respiratory failure 

requiring BiPAP.)


Chief complaint: Status post robotic-assisted right sided radical nephrectomy 

and lyses of a


History of present illness: 





This is a 60-year-old female, recently discovered to have a 7.3 cm right sided 

adrenal mass concerning for renal cell carcinoma.  Patient underwent robotic-

assisted right sided radical nephrectomy and lyses of adhesions yesterday.  

However post extubation, patient required to be placed on BiPAP, and could not 

be switched to a nasal cannula.  Multiple attempts were made in the recovery 

room, then I was notified by the surgeon about this concern, I recommended that 

the patient remains on BiPAP, recommended transferring the patient to the ICU, 

and to hold Robaxin, continue Dilaudid.  Overnight the patient was placed on 

BiPAP at 15/5 and 40% FiO2.  Patient did extremely well through the night, and 

after I evaluated the patient this morning, I switch her to a nasal cannula at 2

L.  And Her O2 saturation just above 90%.  Patient is morbidly obese, she has 

history of chronic cough for the last 6 months, she has been a 40-pack-year 

smoker but she quit smoking about 5 years ago.  No documented history of 

obstructive sleep apnea no previous sleep study, patient is being evaluated on 

outpatient basis for a 6 month history of cough, and workup is pending supposed 

to have a full PFT in the next few weeks.  Patient is known to have history of 

obesity, diabetes, dyslipidemia, hypertension, degenerative joint disease, 

bipolar disorder.





Review of Systems





Constitutional: Negative, no fever no chills no weight loss.


HEENT: Symptoms of obstructive sleep apnea/mild.


Pulmonary: Chronic cough, 6 months duration, no wheezing, no shortness of 

breath.  No chest pain.  No fever no chills no hemoptysis.


Cardiac: History of hypertension, no symptoms of chest pain or orthopnea or PND.


GI: Denies any symptoms of GERD.


Genitourinary: Negative.  Patient is status post right-sided nephrectomy.


Musculoskeletal: History of degenerative joint disease.


Skin: Negative.


Neurologic: Negative no headaches blurred vision dizziness.


Endocrine: No heat or cold intolerance.  Known history of type 2 diabetes.


Hematologic: No clotting bleeding or bruising.


Psychiatric: History of bipolar disorder, schizoaffective disorder.  And history

of depression.  Presently inactive.





Past Medical History


Past Medical History: Diabetes Mellitus, Hyperlipidemia, Hypertension, 

Osteoarthritis (OA)


Additional Past Medical History / Comment(s): Osteoarthritis of the left hip, ty

pe II diabetes. PAST GYN HISTORY: She has no history of STDs.  She used ERT from

3906-3037.


History of Any Multi-Drug Resistant Organisms: None Reported


Past Surgical History: Cholecystectomy, Hysterectomy


Additional Past Surgical History / Comment(s): ALCIDES LSO and prior right 

oophorectomy.  Arthroscopically with torn meniscus in right knee.  right ankle 

chip fx.


Past Anesthesia/Blood Transfusion Reactions: No Reported Reaction


Additional Past Alcohol Use History / Comment(s): states she smokes 1ppd refused

nicotine patch





- Past Family History


  ** Father


Family Medical History: Myocardial Infarction (MI)


Additional Family Medical History / Comment(s): Father has  and patient does

not know cause.  Apparently mother is alive at age 81.  Patient has no sisters 

and no brothers.  Family history of bipolar.  Patient has 1 daughter and 1 son.





Medications and Allergies


                                Home Medications











 Medication  Instructions  Recorded  Confirmed  Type


 


ARIPiprazole [Abilify] 10 mg PO HS 19 History


 


Atorvastatin [Lipitor] 20 mg PO HS 19 History


 


hydroCHLOROthiazide [Hydrodiuril] 25 mg PO DAILY 19 History


 


metFORMIN HCL [Glucophage] 1,000 mg PO AC-SUPPER 19 History


 


Glimepiride [Amaryl] 4 mg PO AC-BRKFST 20 History








                                    Allergies











Allergy/AdvReac Type Severity Reaction Status Date / Time


 


No Known Allergies Allergy   Verified 20 11:25














Physical Exam


Vitals: 


                                   Vital Signs











  Temp Pulse Pulse Resp BP BP Pulse Ox


 


 20 11:00   97   20  115/59   88 L


 


 20 10:00   99   22  125/70   89 L


 


 20 09:00   94   21  118/67   91 L


 


 20 08:00  98.1 F  92   14  125/71   91 L


 


 20 07:00   98   21  115/66   92 L


 


 20 06:00   93   22  111/70   94 L


 


 20 05:00   94   17  111/63   92 L


 


 20 04:00  98.2 F  101 H   19  112/66   91 L


 


 20 03:00   105 H   15  113/60   90 L


 


 20 02:00   106 H   19  110/63   93 L


 


 20 01:00   101 H   22  155/77   92 L


 


 20 00:00  98.0 F  110 H   24  136/83   90 L


 


 20 23:00   108 H   11 L  125/80   92 L


 


 20 22:00   108 H   20  119/71   89 L


 


 20 21:00   101 H   25 H  119/62   91 L


 


 20 20:00  98.7 F  108 H   20  121/70   89 L


 


 20 19:00   101 H   24  125/69   93 L


 


 20 18:50   103 H   32 H    94 L


 


 20 18:40   106 H   20    92 L


 


 20 18:30   101 H   26 H    93 L


 


 20 18:20   101 H   28 H    91 L


 


 20 18:10  97.9 F  100   27 H  129/63   89 L


 


 20 18:00   100   18   


 


 20 17:30    99  18   119/58  95


 


 20 17:15    102 H  18   124/70  96


 


 20 17:00    102 H  18   124/70  92 L


 


 20 16:30    92  18   121/71  94 L


 


 20 16:26   94     


 


 20 16:15    94  18   116/71  94 L


 


 20 16:06   96     


 


 20 16:00    96  18   129/69  94 L


 


 20 15:46  97 F L   100  18   128/78  94 L


 


 20 12:15    106 H  18   135/77  94 L








                                Intake and Output











 20





 22:59 06:59 14:59


 


Intake Total 1000 1100 600


 


Output Total 240 405 180


 


Balance 760 695 420


 


Intake:   


 


  IV 1000 1000 600


 


    Magnesium Sulfate-D5w Pmx   100





    1 gm In Dextrose/Water 1   





    100ml.bag @ 100 mls/hr   





    IVPB Q1H [facility] Rx#:   





    [id number]   


 


    Sodium Chloride 0.9% 1, 500 1000 500





    000 ml @ 125 mls/hr IV .   





    Q8H [facility] Rx#:[id number]   


 


  Oral  100 


 


Output:   


 


  Urine 240 405 180


 


Other:   


 


  Voiding Method Indwelling Catheter Indwelling Catheter 


 


  Weight 118.7 kg 121.8 kg 














Physical Exam: Revealed 60-year-old female in no distress.  Extremely pleasant.


Head: Atraumatic, normocephalic.


HEENT:[Neck is supple.] [No neck masses.] [No thyromegaly.] [No JVD.]  

Mallampati class III.


Chest: [Symmetrical chest expansion, diminished at the bases no rhonchi and no 

wheezes


Cardiac Exam: Distant S1 and S2. no S3 gallop, no murmur.]


Abdomen: Obese, [Soft, nontender,  no megaly, no rebound, no guarding, normal 

bowel sounds.]


Extremities: [No clubbing, no edema, no cyanosis.]  Good pulses bilaterally.


Neurological Exam: [No focal neurologic deficit.]  Alert oriented 3 focal 

deficits.


Psychiatric: Normal mood affect and normal mental status examination.


Skin: No rashes.


Musculoskeletal no deformities noted limitation range of motion.


Lymphatics: No lymphadenopathy cervical or supraclavicular





Results





- Laboratory Findings


CBC and BMP: 


                                 20 05:25





                                 20 05:25


ABG











ABG pH  7.34  (7.35-7.45)  L  20  18:23    


 


ABG pCO2  57 mmHg (35-45)  H  20  18:23    


 


ABG pO2  83 mmHg ()   20  18:23    


 


ABG O2 Saturation  92.0 % (94-97)  L  20  18:23    








Abnormal lab findings: 


                                  Abnormal Labs











  20





  11:42 13:55 16:05


 


WBC   


 


Hct   


 


Neutrophils #   


 


Lymphocytes #   


 


ABG pH   


 


ABG pCO2   


 


ABG HCO3   


 


ABG Total CO2   


 


ABG O2 Saturation   


 


Sodium   


 


Chloride   


 


Carbon Dioxide   


 


Creatinine   


 


Glucose   


 


POC Glucose (mg/dL)  285 H  306 H  324 H


 


Calcium   


 


Magnesium   


 


AST   


 


ALT   














  20





  17:08 18:00 18:03


 


WBC    17.0 H


 


Hct    46.1 H


 


Neutrophils #    15.7 H


 


Lymphocytes #    0.9 L


 


ABG pH   


 


ABG pCO2   


 


ABG HCO3   


 


ABG Total CO2   


 


ABG O2 Saturation   


 


Sodium   


 


Chloride   


 


Carbon Dioxide   


 


Creatinine   


 


Glucose   


 


POC Glucose (mg/dL)  308 H  314 H 


 


Calcium   


 


Magnesium   


 


AST   


 


ALT   














  20





  18:03 18:23 19:06


 


WBC   


 


Hct   


 


Neutrophils #   


 


Lymphocytes #   


 


ABG pH   7.34 L 


 


ABG pCO2   57 H 


 


ABG HCO3   30 H 


 


ABG Total CO2   18 L 


 


ABG O2 Saturation   92.0 L 


 


Sodium  136 L  


 


Chloride  97 L  


 


Carbon Dioxide   


 


Creatinine  1.10 H  


 


Glucose  331 H  


 


POC Glucose (mg/dL)    297 H


 


Calcium   


 


Magnesium  1.5 L  


 


AST  235 H  


 


ALT  138 H  














  20





  20:34 00:30 05:25


 


WBC   


 


Hct   


 


Neutrophils #   


 


Lymphocytes #   


 


ABG pH   


 


ABG pCO2   


 


ABG HCO3   


 


ABG Total CO2   


 


ABG O2 Saturation   


 


Sodium   


 


Chloride   


 


Carbon Dioxide    32 H


 


Creatinine    1.15 H


 


Glucose    178 H


 


POC Glucose (mg/dL)  279 H  220 H 


 


Calcium    8.0 L


 


Magnesium   


 


AST   


 


ALT   














  20





  05:25 07:21 11:56


 


WBC   


 


Hct   


 


Neutrophils #   


 


Lymphocytes #   


 


ABG pH   


 


ABG pCO2   


 


ABG HCO3   


 


ABG Total CO2   


 


ABG O2 Saturation   


 


Sodium   


 


Chloride   


 


Carbon Dioxide   


 


Creatinine   


 


Glucose   


 


POC Glucose (mg/dL)   173 H  257 H


 


Calcium   


 


Magnesium  1.5 L  


 


AST   


 


ALT   














- Diagnostic Findings


Chest x-ray: image reviewed (By basilar atelectasis, low lung volumes, otherwise

no evidence of active disease)





Assessment and Plan


Assessment: 





Impression:


Status post right sided robotic-assisted nephrectomy postoperative day #1.


Postoperative hypoxic and hypercapnic respiratory failure, unexpected, mostly 

related to obesity/hypoventilation syndrome and obstructive sleep apnea syndrome

is strongly suspected.  With postoperative atelectasis.


Suspect some component of COPD, likely mild considering the patient had a 

40-pack-year smoking history.  Again doubt being active COPD.


Obstructive sleep apnea syndrome is strongly suspected, patient will need to ha

ve outpatient sleep study.


Morbid obesity.


Type 2 diabetes.


Degenerative joint disease


Benign essential hypertension


History of bipolar disorder.


Ex-smoker, quit smoking 5 years ago.


 chronic cough, being investigated on outpatient basis.





Recommendation:


Discontinue BiPAP 


Switch patient to 2 L nasal cannula and titrate FiO2 to keep O2 saturation just 

above 90%.


Incentive spirometry.


Early ambulation.


DuoNeb updrafts 4 times a day and when necessary.


Outpatient follow-up regarding chronic cough obstructive sleep apnea and 

possible component of COPD.  Patient will likely benefit from having a sleep 

study on outpatient basis.  And will definitely need a full PFT.


Will transfer patient out of the ICU to regular medical floor.


Will follow.





Time with Patient: Greater than 30

## 2020-09-04 NOTE — P.CONS
History of Present Illness





- History of Present Illness





This is a pleasant 60 years old female who was admitted for 7.3 cm right-sided 

renal mass concerning for renal cell carcinoma.  Diabetes mellitus, 

hypertension, osteoarthritis.  Also patient with history of bipolar depression 

and schizoaffective disorder


Patient is a status post robotic-assisted right side to radical nephrectomy with

lysis of adhesions.  Today is postoperative day #1








Vitas looks stable.


UNREMARKABLE CBC AND BMP WITH CREATININE 1.1 AND sugar is controlled.  Chest x-

ray low lung volume suspected interstitial pulmonary edema.


CT of the abdomen and pelvis with contrast from  showing the right kidney 

mass.  CT of the chest : Small focus of groundglass infiltrate in the region

of the lingula correlated for inflammatory process.  Basilar parenchymal 

scarring


Patient is currently on sodium chloride at 1 25 mL/h and is also on metformin 

and Amaryl.

















Review of Systems





CONSTITUTIONAL: No fever, no malaise, no fatigue. 


HEENT: No recent visual problems or hearing problems. Denied any sore throat. 


CARDIOVASCULAR: No  orthopnea, PND, no palpitations, no syncope. 


PULMONARY: No shortness of breath, no cough, no hemoptysis. 


GASTROINTESTINAL: No diarrhea, no nausea, no vomiting, no abdominal pain. 

Normoactive bowel sounds. 


NEUROLOGICAL: No headaches, no weakness, no numbness. 


HEMATOLOGICAL: Denies any bleeding or petechiae. 


GENITOURINARY: Denies any burning micturition, frequency, or urgency. 


MUSCULOSKELETAL/RHEUMATOLOGICAL: Denies any joint pain, swelling, or any muscle 

pain. 


ENDOCRINE: Denies any polyuria or polydipsia.








Past Medical History


Past Medical History: Diabetes Mellitus, Hyperlipidemia, Hypertension, 

Osteoarthritis (OA)


Additional Past Medical History / Comment(s): Osteoarthritis of the left hip, 

type II diabetes. PAST GYN HISTORY: She has no history of STDs.  She used ERT 

from 4029-7024.


History of Any Multi-Drug Resistant Organisms: None Reported


Past Surgical History: Cholecystectomy, Hysterectomy


Additional Past Surgical History / Comment(s): ALCIDES LSO and prior right 

oophorectomy.  Arthroscopically with torn meniscus in right knee.  right ankle 

chip fx.


Past Anesthesia/Blood Transfusion Reactions: No Reported Reaction


Additional Past Alcohol Use History / Comment(s): states she smokes 1ppd refused

nicotine patch





- Past Family History


  ** Father


Family Medical History: Myocardial Infarction (MI)


Additional Family Medical History / Comment(s): Father has  and patient does

not know cause.  Apparently mother is alive at age 81.  Patient has no sisters 

and no brothers.  Family history of bipolar.  Patient has 1 daughter and 1 son.





Medications and Allergies


                                Home Medications











 Medication  Instructions  Recorded  Confirmed  Type


 


ARIPiprazole [Abilify] 10 mg PO HS 19 History


 


Atorvastatin [Lipitor] 20 mg PO HS 19 History


 


hydroCHLOROthiazide [Hydrodiuril] 25 mg PO DAILY 19 History


 


metFORMIN HCL [Glucophage] 1,000 mg PO AC-SUPPER 19 History


 


Glimepiride [Amaryl] 4 mg PO AC-BRKFST 20 History








                                    Allergies











Allergy/AdvReac Type Severity Reaction Status Date / Time


 


No Known Allergies Allergy   Verified 20 11:25














Physical Exam


Vitals: 


                                   Vital Signs











  Temp Pulse Pulse Resp BP BP Pulse Ox


 


 20 07:00   98   21  115/66   92 L


 


 20 06:00   93   22  111/70   94 L


 


 20 05:00   94   17  111/63   92 L


 


 20 04:00  98.2 F  101 H   19  112/66   91 L


 


 20 03:00   105 H   15  113/60   90 L


 


 20 02:00   106 H   19  110/63   93 L


 


 20 01:00   101 H   22  155/77   92 L


 


 20 00:00  98.0 F  110 H   24  136/83   90 L


 


 20 23:00   108 H   11 L  125/80   92 L


 


 20 22:00   108 H   20  119/71   89 L


 


 20 21:00   101 H   25 H  119/62   91 L


 


 20 20:00  98.7 F  108 H   20  121/70   89 L


 


 20 19:00   101 H   24  125/69   93 L


 


 20 18:50   103 H   32 H    94 L


 


 20 18:40   106 H   20    92 L


 


 20 18:30   101 H   26 H    93 L


 


 20 18:20   101 H   28 H    91 L


 


 20 18:10  97.9 F  100   27 H  129/63   89 L


 


 20 18:00   100   18   


 


 20 17:30    99  18   119/58  95


 


 20 17:15    102 H  18   124/70  96


 


 20 17:00    102 H  18   124/70  92 L


 


 20 16:30    92  18   121/71  94 L


 


 20 16:26   94     


 


 20 16:15    94  18   116/71  94 L


 


 20 16:06   96     


 


 20 16:00    96  18   129/69  94 L


 


 20 15:46  97 F L   100  18   128/78  94 L


 


 20 12:15    106 H  18   135/77  94 L


 


 20 11:42  98.1 F   114 H  20   144/80  92 L








                                Intake and Output











 20





 22:59 06:59 14:59


 


Intake Total 1000 1100 125


 


Output Total 240 405 50


 


Balance 760 695 75


 


Intake:   


 


  IV 1000 1000 125


 


    Sodium Chloride 0.9% 1, 500 1000 125





    000 ml @ 125 mls/hr IV .   





    Q8H [facility] Rx#:[id number]   


 


  Oral  100 


 


Output:   


 


  Urine 240 405 50


 


Other:   


 


  Voiding Method Indwelling Catheter Indwelling Catheter 


 


  Weight 118.7 kg 121.8 kg 

















GENERAL: The patient is alert and oriented x3, not in any acute distress. Well 

developed, well nourished. 


HEENT: Pupils are round and equally reacting to light. EOMI. No scleral icterus.

 No conjunctival pallor. Normocephalic, atraumatic. No pharyngeal erythema. No 

thyromegaly. 


CARDIOVASCULAR: S1 and S2 present. No murmurs, rubs, or gallops. 


PULMONARY: Chest is clear to auscultation, no wheezing or crackles. 


ABDOMEN: Soft, nontender, nondistended, normoactive bowel sounds. No palpable 

organomegaly. 


MUSCULOSKELETAL: No joint swelling or deformity. 


EXTREMITIES: No cyanosis, clubbing, or pedal edema. 


NEUROLOGICAL: Gross neurological examination did not reveal any focal deficits. 


SKIN: No rashes. No petechiae








Results


CBC & Chem 7: 


                                 20 05:25





                                 20 05:25


Labs: 


                  Abnormal Lab Results - Last 24 Hours (Table)











  20 Range/Units





  11:42 13:55 16:05 


 


WBC     (3.8-10.6)  k/uL


 


Hct     (34.0-46.0)  %


 


Neutrophils #     (1.3-7.7)  k/uL


 


Lymphocytes #     (1.0-4.8)  k/uL


 


ABG pH     (7.35-7.45)  


 


ABG pCO2     (35-45)  mmHg


 


ABG HCO3     (21-25)  mmol/L


 


ABG Total CO2     (19-24)  mmol/L


 


ABG O2 Saturation     (94-97)  %


 


Sodium     (137-145)  mmol/L


 


Chloride     ()  mmol/L


 


Carbon Dioxide     (22-30)  mmol/L


 


Creatinine     (0.52-1.04)  mg/dL


 


Glucose     (74-99)  mg/dL


 


POC Glucose (mg/dL)  285 H  306 H  324 H  (75-99)  mg/dL


 


Calcium     (8.4-10.2)  mg/dL


 


Magnesium     (1.6-2.3)  mg/dL


 


AST     (14-36)  U/L


 


ALT     (4-34)  U/L














  20 Range/Units





  17:08 18:00 18:03 


 


WBC    17.0 H  (3.8-10.6)  k/uL


 


Hct    46.1 H  (34.0-46.0)  %


 


Neutrophils #    15.7 H  (1.3-7.7)  k/uL


 


Lymphocytes #    0.9 L  (1.0-4.8)  k/uL


 


ABG pH     (7.35-7.45)  


 


ABG pCO2     (35-45)  mmHg


 


ABG HCO3     (21-25)  mmol/L


 


ABG Total CO2     (19-24)  mmol/L


 


ABG O2 Saturation     (94-97)  %


 


Sodium     (137-145)  mmol/L


 


Chloride     ()  mmol/L


 


Carbon Dioxide     (22-30)  mmol/L


 


Creatinine     (0.52-1.04)  mg/dL


 


Glucose     (74-99)  mg/dL


 


POC Glucose (mg/dL)  308 H  314 H   (75-99)  mg/dL


 


Calcium     (8.4-10.2)  mg/dL


 


Magnesium     (1.6-2.3)  mg/dL


 


AST     (14-36)  U/L


 


ALT     (4-34)  U/L














  20 Range/Units





  18:03 18:23 19:06 


 


WBC     (3.8-10.6)  k/uL


 


Hct     (34.0-46.0)  %


 


Neutrophils #     (1.3-7.7)  k/uL


 


Lymphocytes #     (1.0-4.8)  k/uL


 


ABG pH   7.34 L   (7.35-7.45)  


 


ABG pCO2   57 H   (35-45)  mmHg


 


ABG HCO3   30 H   (21-25)  mmol/L


 


ABG Total CO2   18 L   (19-24)  mmol/L


 


ABG O2 Saturation   92.0 L   (94-97)  %


 


Sodium  136 L    (137-145)  mmol/L


 


Chloride  97 L    ()  mmol/L


 


Carbon Dioxide     (22-30)  mmol/L


 


Creatinine  1.10 H    (0.52-1.04)  mg/dL


 


Glucose  331 H    (74-99)  mg/dL


 


POC Glucose (mg/dL)    297 H  (75-99)  mg/dL


 


Calcium     (8.4-10.2)  mg/dL


 


Magnesium  1.5 L    (1.6-2.3)  mg/dL


 


AST  235 H    (14-36)  U/L


 


ALT  138 H    (4-34)  U/L














  20 Range/Units





  20:34 00:30 05:25 


 


WBC     (3.8-10.6)  k/uL


 


Hct     (34.0-46.0)  %


 


Neutrophils #     (1.3-7.7)  k/uL


 


Lymphocytes #     (1.0-4.8)  k/uL


 


ABG pH     (7.35-7.45)  


 


ABG pCO2     (35-45)  mmHg


 


ABG HCO3     (21-25)  mmol/L


 


ABG Total CO2     (19-24)  mmol/L


 


ABG O2 Saturation     (94-97)  %


 


Sodium     (137-145)  mmol/L


 


Chloride     ()  mmol/L


 


Carbon Dioxide    32 H  (22-30)  mmol/L


 


Creatinine    1.15 H  (0.52-1.04)  mg/dL


 


Glucose    178 H  (74-99)  mg/dL


 


POC Glucose (mg/dL)  279 H  220 H   (75-99)  mg/dL


 


Calcium    8.0 L  (8.4-10.2)  mg/dL


 


Magnesium     (1.6-2.3)  mg/dL


 


AST     (14-36)  U/L


 


ALT     (4-34)  U/L














  20 Range/Units





  07:21 


 


WBC   (3.8-10.6)  k/uL


 


Hct   (34.0-46.0)  %


 


Neutrophils #   (1.3-7.7)  k/uL


 


Lymphocytes #   (1.0-4.8)  k/uL


 


ABG pH   (7.35-7.45)  


 


ABG pCO2   (35-45)  mmHg


 


ABG HCO3   (21-25)  mmol/L


 


ABG Total CO2   (19-24)  mmol/L


 


ABG O2 Saturation   (94-97)  %


 


Sodium   (137-145)  mmol/L


 


Chloride   ()  mmol/L


 


Carbon Dioxide   (22-30)  mmol/L


 


Creatinine   (0.52-1.04)  mg/dL


 


Glucose   (74-99)  mg/dL


 


POC Glucose (mg/dL)  173 H  (75-99)  mg/dL


 


Calcium   (8.4-10.2)  mg/dL


 


Magnesium   (1.6-2.3)  mg/dL


 


AST   (14-36)  U/L


 


ALT   (4-34)  U/L














Assessment and Plan


Assessment: 





Right sided kidney mass concerning for renal cell carcinoma


Acute hypoxemic hypercapnic respiratory failure suspected due to obesity 

hypoventilation syndrome and obstructive sleep apnea


Diabetes mellitus


Hypertension


Osteoarthritis.


History of bipolar depression and schizoaffective disorder, not an active issue.











Plan: 





This is a pleasant 60 years old female who presented for right  renal mass 

concerning for RCC.  Continue with pain management, Continue with oxygen and 

bronchodilator as needed.  Pulmonary consult.


Hold metformin.  Continue with Amaryl.   continue with insulin sliding scale


Labs and medication were reviewed..  Continue same treatment.  Continue with 

symptomatic treatment.  Resume home medication


Monitor lytes and vitals.  DVT and GI prophylaxis.  Further recommendations of 

the clinical course of the patient


DVT prophylaxis: Subcutaneous heparin


GI Prophylaxis: Pepcid


PT/OT: Pending


Prognosis is guarded

## 2020-09-05 VITALS — RESPIRATION RATE: 20 BRPM

## 2020-09-05 LAB
ANION GAP SERPL CALC-SCNC: 5 MMOL/L
BASOPHILS # BLD AUTO: 0 K/UL (ref 0–0.2)
BASOPHILS NFR BLD AUTO: 0 %
BUN SERPL-SCNC: 15 MG/DL (ref 7–17)
CALCIUM SPEC-MCNC: 8.2 MG/DL (ref 8.4–10.2)
CHLORIDE SERPL-SCNC: 102 MMOL/L (ref 98–107)
CO2 SERPL-SCNC: 28 MMOL/L (ref 22–30)
EOSINOPHIL # BLD AUTO: 0.1 K/UL (ref 0–0.7)
EOSINOPHIL NFR BLD AUTO: 1 %
ERYTHROCYTE [DISTWIDTH] IN BLOOD BY AUTOMATED COUNT: 4 M/UL (ref 3.8–5.4)
ERYTHROCYTE [DISTWIDTH] IN BLOOD: 14.5 % (ref 11.5–15.5)
GLUCOSE BLD-MCNC: 169 MG/DL (ref 75–99)
GLUCOSE BLD-MCNC: 196 MG/DL (ref 75–99)
GLUCOSE BLD-MCNC: 207 MG/DL (ref 75–99)
GLUCOSE BLD-MCNC: 307 MG/DL (ref 75–99)
GLUCOSE SERPL-MCNC: 230 MG/DL (ref 74–99)
HCT VFR BLD AUTO: 38.5 % (ref 34–46)
HGB BLD-MCNC: 12.3 GM/DL (ref 11.4–16)
LYMPHOCYTES # SPEC AUTO: 0.9 K/UL (ref 1–4.8)
LYMPHOCYTES NFR SPEC AUTO: 9 %
MAGNESIUM SPEC-SCNC: 1.9 MG/DL (ref 1.6–2.3)
MCH RBC QN AUTO: 30.7 PG (ref 25–35)
MCHC RBC AUTO-ENTMCNC: 31.9 G/DL (ref 31–37)
MCV RBC AUTO: 96.3 FL (ref 80–100)
MONOCYTES # BLD AUTO: 0.3 K/UL (ref 0–1)
MONOCYTES NFR BLD AUTO: 3 %
NEUTROPHILS # BLD AUTO: 8.1 K/UL (ref 1.3–7.7)
NEUTROPHILS NFR BLD AUTO: 85 %
PLATELET # BLD AUTO: 206 K/UL (ref 150–450)
POTASSIUM SERPL-SCNC: 3.7 MMOL/L (ref 3.5–5.1)
SODIUM SERPL-SCNC: 135 MMOL/L (ref 137–145)
WBC # BLD AUTO: 9.4 K/UL (ref 3.8–10.6)

## 2020-09-05 RX ADMIN — INSULIN ASPART SCH UNIT: 100 INJECTION, SOLUTION INTRAVENOUS; SUBCUTANEOUS at 07:53

## 2020-09-05 RX ADMIN — HEPARIN SODIUM SCH UNIT: 5000 INJECTION, SOLUTION INTRAVENOUS; SUBCUTANEOUS at 09:31

## 2020-09-05 RX ADMIN — IPRATROPIUM BROMIDE AND ALBUTEROL SULFATE SCH ML: .5; 3 SOLUTION RESPIRATORY (INHALATION) at 03:17

## 2020-09-05 RX ADMIN — GLIMEPIRIDE SCH MG: 4 TABLET ORAL at 07:54

## 2020-09-05 RX ADMIN — HEPARIN SODIUM SCH UNIT: 5000 INJECTION, SOLUTION INTRAVENOUS; SUBCUTANEOUS at 23:06

## 2020-09-05 RX ADMIN — IPRATROPIUM BROMIDE AND ALBUTEROL SULFATE SCH ML: .5; 3 SOLUTION RESPIRATORY (INHALATION) at 15:26

## 2020-09-05 RX ADMIN — HEPARIN SODIUM SCH UNIT: 5000 INJECTION, SOLUTION INTRAVENOUS; SUBCUTANEOUS at 16:44

## 2020-09-05 RX ADMIN — INSULIN ASPART SCH UNIT: 100 INJECTION, SOLUTION INTRAVENOUS; SUBCUTANEOUS at 21:26

## 2020-09-05 RX ADMIN — CEFAZOLIN SCH: 330 INJECTION, POWDER, FOR SOLUTION INTRAMUSCULAR; INTRAVENOUS at 19:21

## 2020-09-05 RX ADMIN — INSULIN ASPART SCH UNIT: 100 INJECTION, SOLUTION INTRAVENOUS; SUBCUTANEOUS at 16:44

## 2020-09-05 RX ADMIN — IPRATROPIUM BROMIDE AND ALBUTEROL SULFATE SCH ML: .5; 3 SOLUTION RESPIRATORY (INHALATION) at 19:56

## 2020-09-05 RX ADMIN — ATORVASTATIN CALCIUM SCH MG: 20 TABLET, FILM COATED ORAL at 21:25

## 2020-09-05 RX ADMIN — IPRATROPIUM BROMIDE AND ALBUTEROL SULFATE SCH ML: .5; 3 SOLUTION RESPIRATORY (INHALATION) at 07:44

## 2020-09-05 RX ADMIN — CEFAZOLIN SCH: 330 INJECTION, POWDER, FOR SOLUTION INTRAMUSCULAR; INTRAVENOUS at 09:31

## 2020-09-05 RX ADMIN — IPRATROPIUM BROMIDE AND ALBUTEROL SULFATE SCH ML: .5; 3 SOLUTION RESPIRATORY (INHALATION) at 11:14

## 2020-09-05 RX ADMIN — INSULIN ASPART SCH UNIT: 100 INJECTION, SOLUTION INTRAVENOUS; SUBCUTANEOUS at 12:02

## 2020-09-05 RX ADMIN — INSULIN DETEMIR SCH UNIT: 100 INJECTION, SOLUTION SUBCUTANEOUS at 21:31

## 2020-09-05 NOTE — P.PN
Subjective


Progress Note Date: 09/05/20


Principal diagnosis: 





Right renal mass





Postoperative day #2 status post right radical nephrectomy, patient admitted to 

the ICU postoperatively due to BiPAP requirement.  This am on evaluation she is 

on 2 L of oxygen, no respiratory distress.  Her pain is controlled she is 

tolerating a diet has been out of bed into chair





Objective





- Vital Signs


Vital signs: 


                                   Vital Signs











Temp  98.4 F   09/05/20 16:00


 


Pulse  80   09/05/20 16:00


 


Resp  15   09/05/20 16:00


 


BP  130/70   09/05/20 16:00


 


Pulse Ox  92 L  09/05/20 12:04








                                 Intake & Output











 09/05/20 09/05/20 09/06/20





 06:59 18:59 06:59


 


Intake Total 200 560 


 


Balance 200 560 


 


Weight 123.3 kg 123.3 kg 


 


Intake:   


 


   160 


 


    Sodium Chloride 0.9% 1, 200 160 





    000 ml @ 125 mls/hr IV .   





    Q8H [facility] Rx#:[id number]   


 


  Oral  400 


 


Other:   


 


  Voiding Method Indwelling Catheter Toilet 


 


  # Voids 3 3 














- Constitutional


General appearance: Present: no acute distress





- Gastrointestinal


General gastrointestinal: Present: soft.  Absent: distended, rigid





- Psychiatric


Psychiatric: Present: A&O x's 3





- Labs


CBC & Chem 7: 


                                 09/05/20 04:49





                                 09/05/20 04:49


Labs: 


                  Abnormal Lab Results - Last 24 Hours (Table)











  09/04/20 09/05/20 09/05/20 Range/Units





  20:15 04:49 04:49 


 


Neutrophils #   8.1 H   (1.3-7.7)  k/uL


 


Lymphocytes #   0.9 L   (1.0-4.8)  k/uL


 


Sodium    135 L  (137-145)  mmol/L


 


Creatinine    1.22 H  (0.52-1.04)  mg/dL


 


Glucose    230 H  (74-99)  mg/dL


 


POC Glucose (mg/dL)  242 H    (75-99)  mg/dL


 


Calcium    8.2 L  (8.4-10.2)  mg/dL














  09/05/20 09/05/20 09/05/20 Range/Units





  07:43 11:44 16:40 


 


Neutrophils #     (1.3-7.7)  k/uL


 


Lymphocytes #     (1.0-4.8)  k/uL


 


Sodium     (137-145)  mmol/L


 


Creatinine     (0.52-1.04)  mg/dL


 


Glucose     (74-99)  mg/dL


 


POC Glucose (mg/dL)  207 H  307 H  169 H  (75-99)  mg/dL


 


Calcium     (8.4-10.2)  mg/dL














Assessment and Plan


Assessment: 





S/P right radical nephrectomy, admitted to ICU postoperatively due to BiPAP 

requirement.  On 2 L oxygen this am 


Plan: 





-Pain control


-Ambulate


-repeat am labs 


-possible discharge home tomorrow if of oxygen

## 2020-09-05 NOTE — P.PN
Subjective


Progress Note Date: 09/05/20


Principal diagnosis: 





Postoperative hypoxic and hypercapnic respiratory failure





This is a 60-year-old female, recently discovered to have a 7.3 cm right sided 

adrenal mass concerning for renal cell carcinoma.  Patient underwent robotic-

assisted right sided radical nephrectomy and lyses of adhesions yesterday.  

However post extubation, patient required to be placed on BiPAP, and could not 

be switched to a nasal cannula.  Multiple attempts were made in the recovery 

room, then I was notified by the surgeon about this concern, I recommended that 

the patient remains on BiPAP, recommended transferring the patient to the ICU, 

and to hold Robaxin, continue Dilaudid.  Overnight the patient was placed on 

BiPAP at 15/5 and 40% FiO2.  Patient did extremely well through the night, and 

after I evaluated the patient this morning, I switch her to a nasal cannula at 2

L.  And Her O2 saturation just above 90%.  Patient is morbidly obese, she has 

history of chronic cough for the last 6 months, she has been a 40-pack-year 

smoker but she quit smoking about 5 years ago.  No documented history of 

obstructive sleep apnea no previous sleep study, patient is being evaluated on 

outpatient basis for a 6 month history of cough, and workup is pending supposed 

to have a full PFT in the next few weeks.  Patient is known to have history of 

obesity, diabetes, dyslipidemia, hypertension, degenerative joint disease, 

bipolar disorder.





Reevaluated today on 9/5/20, patient remains as an overflow in the ICU.  Patient

is doing great, asymptomatic.  Denies any cough no wheezing no shortness of 

breath.  Patient is now at 2 L nasal cannula, she had a low-grade temp of 100.2 

yesterday.  Chest x-ray showed minimal bibasilar atelectasis.  Patient is 

compliant with her incentive spirometry, and she is becoming more active.  IV 

fluids have been discontinued, and I still plan to transfer the patient to a 

regular medical floor today, possible discharge planning in the next 24-48 hours

once cleared by urology.  CBC is normal index was abnormal renal profile showed 

slight rise in creatinine to 1.22, baseline is 1.10 before her nephrectomy.











Objective





- Vital Signs


Vital signs: 


                                   Vital Signs











Temp  100.2 F H  09/05/20 08:00


 


Pulse  102 H  09/05/20 11:15


 


Resp  15   09/05/20 08:00


 


BP  131/63   09/05/20 08:00


 


Pulse Ox  89 L  09/05/20 00:00








                                 Intake & Output











 09/04/20 09/05/20 09/05/20





 18:59 06:59 18:59


 


Intake Total 1450 200 360


 


Output Total 405  


 


Balance 1045 200 360


 


Weight  123.3 kg 123.3 kg


 


Intake:   


 


  IV 1450 200 160


 


    Magnesium Sulfate-D5w Pmx 200  





    1 gm In Dextrose/Water 1   





    100ml.bag @ 100 mls/hr   





    [facility] Q1H [facility] Rx#:   





    [id number]   


 


    Sodium Chloride 0.9% 1, 1250 200 160





    000 ml @ 125 mls/hr IV .   





    Q8H [facility] Rx#:[id number]   


 


  Oral   200


 


Output:   


 


  Urine 405  


 


Other:   


 


  Voiding Method Indwelling Catheter Indwelling Catheter Toilet


 


  # Voids 0 3 2














- Exam





Physical Exam: Revealed 60-year-old female in no distress.  On 2 L nasal 

cannula.


Head: Atraumatic, normocephalic.


HEENT:[Neck is supple.] [No neck masses.] [No thyromegaly.] [No JVD.]  

Mallampati class III.


Chest: [Symmetrical chest expansion, diminished at the bases no rhonchi and no 

wheezes


Cardiac Exam: Distant S1 and S2. no S3 gallop, no murmur.]


Abdomen: Obese, [Soft, nontender,  no megaly, no rebound, no guarding, normal 

bowel sounds.]


Extremities: [No clubbing, no edema, no cyanosis.]  Good pulses bilaterally.


Neurological Exam: [No focal neurologic deficit.]  Alert oriented 3 focal 

deficits.


Psychiatric: Normal mood affect and normal mental status examination.


Skin: No rashes.


Musculoskeletal no deformities noted limitation range of motion.


Lymphatics: No lymphadenopathy cervical or supraclavicular











- Labs


CBC & Chem 7: 


                                 09/05/20 04:49





                                 09/05/20 04:49


Labs: 


                  Abnormal Lab Results - Last 24 Hours (Table)











  09/04/20 09/04/20 09/04/20 Range/Units





  05:25 11:56 16:46 


 


Neutrophils #     (1.3-7.7)  k/uL


 


Lymphocytes #     (1.0-4.8)  k/uL


 


Sodium     (137-145)  mmol/L


 


Creatinine     (0.52-1.04)  mg/dL


 


Glucose     (74-99)  mg/dL


 


POC Glucose (mg/dL)   257 H  229 H  (75-99)  mg/dL


 


Hemoglobin A1c  11.2 H    (4.0-6.0)  %


 


Calcium     (8.4-10.2)  mg/dL














  09/04/20 09/05/20 09/05/20 Range/Units





  20:15 04:49 04:49 


 


Neutrophils #   8.1 H   (1.3-7.7)  k/uL


 


Lymphocytes #   0.9 L   (1.0-4.8)  k/uL


 


Sodium    135 L  (137-145)  mmol/L


 


Creatinine    1.22 H  (0.52-1.04)  mg/dL


 


Glucose    230 H  (74-99)  mg/dL


 


POC Glucose (mg/dL)  242 H    (75-99)  mg/dL


 


Hemoglobin A1c     (4.0-6.0)  %


 


Calcium    8.2 L  (8.4-10.2)  mg/dL














  09/05/20 Range/Units





  07:43 


 


Neutrophils #   (1.3-7.7)  k/uL


 


Lymphocytes #   (1.0-4.8)  k/uL


 


Sodium   (137-145)  mmol/L


 


Creatinine   (0.52-1.04)  mg/dL


 


Glucose   (74-99)  mg/dL


 


POC Glucose (mg/dL)  207 H  (75-99)  mg/dL


 


Hemoglobin A1c   (4.0-6.0)  %


 


Calcium   (8.4-10.2)  mg/dL














Assessment and Plan


Assessment: 





Impression:


Status post right sided robotic-assisted nephrectomy postoperative day #2


Postoperative hypoxic and hypercapnic respiratory failure, unexpected, mostly 

related to obesity/hypoventilation syndrome and obstructive sleep apnea syndrome

is strongly suspected.  With postoperative atelectasis.


Suspect some component of COPD, likely mild considering the patient had a 

40-pack-year smoking history.  Again doubt being active COPD.


Obstructive sleep apnea syndrome is strongly suspected, patient will need to 

have outpatient sleep study.


Morbid obesity.


Type 2 diabetes.


Degenerative joint disease


Benign essential hypertension


History of bipolar disorder.


Ex-smoker, quit smoking 5 years ago.


 chronic cough, being investigated on outpatient basis.





Recommendation:


Titrate oxygen, and placed on room air if she could tolerate with O2 saturation 

above or equal to 90%.


May use BiPAP only at sleep time


Incentive spirometry.  To be continued.


Continues to ambulate


DuoNeb updrafts 4 times a day and when necessary.


Outpatient follow-up regarding chronic cough obstructive sleep apnea and 

possible component of COPD.  Patient will likely benefit from having a sleep 

study on outpatient basis.  And will definitely need a full PFT.


Transfer out of the ICU once a bed is available.  No need for telemetry 


Will follow.


Time with Patient: Less than 30

## 2020-09-05 NOTE — P.PN
Subjective





This is a pleasant 60 years old female who was admitted for 7.3 cm right-sided 

renal mass concerning for renal cell carcinoma.  Diabetes mellitus, h

ypertension, osteoarthritis.  Also patient with history of bipolar depression 

and schizoaffective disorder


Patient is a status post robotic-assisted right side to radical nephrectomy with

lysis of adhesions.  Today is postoperative day #1








Vitas looks stable.


UNREMARKABLE CBC AND BMP WITH CREATININE 1.1 AND sugar is controlled.  Chest x-

ray low lung volume suspected interstitial pulmonary edema.


CT of the abdomen and pelvis with contrast from 7/27 showing the right kidney 

mass.  CT of the chest 7/17: Small focus of groundglass infiltrate in the region

of the lingula correlated for inflammatory process.  Basilar parenchymal 

scarring


Patient is currently on sodium chloride at 1 25 mL/h and is also on metformin 

and Amaryl.








9/5/20


Patient is awake and remains in the ICU for close monitoring now but she 

selective overflow.  Patient has no dyspnea or coughing or chest pain.  She 

saturating 92% on 2 L oxygen via nasal cannula, Earl catheter was discontinued 

per urology service recommendation and patient is able to be with no problem, 

she denies urgency or increased frequency or burning patient is hemodynamically 

stable.  Patient is requesting to move around.  Her vertical wound on the right 

side and 2 puncture wounds look so close, dry and healing








Review of Systems





CONSTITUTIONAL: No fever, no malaise, no fatigue. 


HEENT: No recent visual problems or hearing problems. Denied any sore throat. 


CARDIOVASCULAR: No  orthopnea, PND, no palpitations, no syncope. 


PULMONARY: No shortness of breath, no cough, no hemoptysis. 


GASTROINTESTINAL: No diarrhea, no nausea, no vomiting, no abdominal pain. 

Normoactive bowel sounds. 


NEUROLOGICAL: No headaches, no weakness, no numbness. 


HEMATOLOGICAL: Denies any bleeding or petechiae. 


GENITOURINARY: Denies any burning micturition, frequency, or urgency. 


MUSCULOSKELETAL/RHEUMATOLOGICAL: Denies any joint pain, swelling, or any muscle 

pain. 


ENDOCRINE: Denies any polyuria or polydipsia.








Objective





- Vital Signs


Vital signs: 


                                   Vital Signs











Temp  98.4 F   09/05/20 16:00


 


Pulse  80   09/05/20 16:00


 


Resp  15   09/05/20 16:00


 


BP  130/70   09/05/20 16:00


 


Pulse Ox  92 L  09/05/20 12:04








                                 Intake & Output











 09/04/20 09/05/20 09/05/20





 18:59 06:59 18:59


 


Intake Total 1450 200 560


 


Output Total 405  


 


Balance 1045 200 560


 


Weight  123.3 kg 123.3 kg


 


Intake:   


 


  IV 1450 200 160


 


    Magnesium Sulfate-D5w Pmx 200  





    1 gm In Dextrose/Water 1   





    100ml.bag @ 100 mls/hr   





    IVPB Q1H [facility] Rx#:   





    [id number]   


 


    Sodium Chloride 0.9% 1, 1250 200 160





    000 ml @ 125 mls/hr IV .   





    Q8H [facility] Rx#:[id number]   


 


  Oral   400


 


Output:   


 


  Urine 405  


 


Other:   


 


  Voiding Method Indwelling Catheter Indwelling Catheter Toilet


 


  # Voids 0 3 3














- Exam





GENERAL: The patient is alert and oriented x3, not in any acute distress.  Obese


HEENT: Pupils are round and equally reacting to light. EOMI. No scleral icterus.

No conjunctival pallor. Normocephalic, atraumatic. No pharyngeal erythema. No 

thyromegaly. 


CARDIOVASCULAR: S1 and S2 present. No murmurs, rubs, or gallops. 


PULMONARY: Chest is clear to auscultation, no wheezing or crackles. 


ABDOMEN: Soft, nontender, nondistended, normoactive bowel sounds. No palpable 

organomegaly. 


MUSCULOSKELETAL: No joint swelling or deformity. 


EXTREMITIES: No cyanosis, clubbing, or pedal edema. 


NEUROLOGICAL: Gross neurological examination did not reveal any focal deficits. 


SKIN: No rashes. no petechiae.





- Labs


CBC & Chem 7: 


                                 09/05/20 04:49





                                 09/05/20 04:49


Labs: 


                  Abnormal Lab Results - Last 24 Hours (Table)











  09/04/20 09/05/20 09/05/20 Range/Units





  20:15 04:49 04:49 


 


Neutrophils #   8.1 H   (1.3-7.7)  k/uL


 


Lymphocytes #   0.9 L   (1.0-4.8)  k/uL


 


Sodium    135 L  (137-145)  mmol/L


 


Creatinine    1.22 H  (0.52-1.04)  mg/dL


 


Glucose    230 H  (74-99)  mg/dL


 


POC Glucose (mg/dL)  242 H    (75-99)  mg/dL


 


Calcium    8.2 L  (8.4-10.2)  mg/dL














  09/05/20 09/05/20 09/05/20 Range/Units





  07:43 11:44 16:40 


 


Neutrophils #     (1.3-7.7)  k/uL


 


Lymphocytes #     (1.0-4.8)  k/uL


 


Sodium     (137-145)  mmol/L


 


Creatinine     (0.52-1.04)  mg/dL


 


Glucose     (74-99)  mg/dL


 


POC Glucose (mg/dL)  207 H  307 H  169 H  (75-99)  mg/dL


 


Calcium     (8.4-10.2)  mg/dL














Assessment and Plan


Assessment: 





Right sided kidney mass concerning for renal cell carcinoma


Acute hypoxemic hypercapnic respiratory failure suspected due to obesity 

hypoventilation syndrome and obstructive sleep apnea


Diabetes mellitus


Hypertension


Osteoarthritis.


History of bipolar depression and schizoaffective disorder, not an active issue.











Plan: 





This is a pleasant 60 years old female who presented for right  renal mass 

concerning for RCC.  Continue with pain management, Continue with oxygen and 

bronchodilator as needed.  Pulmonary consult.


Hold metformin.  Continue with Amaryl.   continue with insulin sliding scale


Labs and medication were reviewed..  Continue same treatment.  Continue with 

symptomatic treatment.  Resume home medication


Monitor lytes and vitals.  DVT and GI prophylaxis.  Further recommendations of 

the clinical course of the patient


DVT prophylaxis: Subcutaneous heparin


GI Prophylaxis: Pepcid


PT/OT: Pending


Prognosis is guarded

## 2020-09-06 VITALS — TEMPERATURE: 99 F | DIASTOLIC BLOOD PRESSURE: 55 MMHG | SYSTOLIC BLOOD PRESSURE: 101 MMHG

## 2020-09-06 VITALS — HEART RATE: 96 BPM

## 2020-09-06 LAB
ANION GAP SERPL CALC-SCNC: 3 MMOL/L
BUN SERPL-SCNC: 12 MG/DL (ref 7–17)
CALCIUM SPEC-MCNC: 8.3 MG/DL (ref 8.4–10.2)
CHLORIDE SERPL-SCNC: 102 MMOL/L (ref 98–107)
CO2 SERPL-SCNC: 33 MMOL/L (ref 22–30)
ERYTHROCYTE [DISTWIDTH] IN BLOOD BY AUTOMATED COUNT: 3.91 M/UL (ref 3.8–5.4)
ERYTHROCYTE [DISTWIDTH] IN BLOOD: 14.5 % (ref 11.5–15.5)
GLUCOSE BLD-MCNC: 130 MG/DL (ref 75–99)
GLUCOSE BLD-MCNC: 218 MG/DL (ref 75–99)
GLUCOSE BLD-MCNC: 283 MG/DL (ref 75–99)
GLUCOSE SERPL-MCNC: 219 MG/DL (ref 74–99)
HCT VFR BLD AUTO: 37.9 % (ref 34–46)
HGB BLD-MCNC: 11.9 GM/DL (ref 11.4–16)
MCH RBC QN AUTO: 30.5 PG (ref 25–35)
MCHC RBC AUTO-ENTMCNC: 31.4 G/DL (ref 31–37)
MCV RBC AUTO: 97 FL (ref 80–100)
PLATELET # BLD AUTO: 219 K/UL (ref 150–450)
POTASSIUM SERPL-SCNC: 4.1 MMOL/L (ref 3.5–5.1)
SODIUM SERPL-SCNC: 138 MMOL/L (ref 137–145)
WBC # BLD AUTO: 8.8 K/UL (ref 3.8–10.6)

## 2020-09-06 RX ADMIN — INSULIN ASPART SCH: 100 INJECTION, SOLUTION INTRAVENOUS; SUBCUTANEOUS at 17:23

## 2020-09-06 RX ADMIN — HEPARIN SODIUM SCH: 5000 INJECTION, SOLUTION INTRAVENOUS; SUBCUTANEOUS at 17:28

## 2020-09-06 RX ADMIN — HEPARIN SODIUM SCH UNIT: 5000 INJECTION, SOLUTION INTRAVENOUS; SUBCUTANEOUS at 07:29

## 2020-09-06 RX ADMIN — INSULIN ASPART SCH UNIT: 100 INJECTION, SOLUTION INTRAVENOUS; SUBCUTANEOUS at 07:29

## 2020-09-06 RX ADMIN — IPRATROPIUM BROMIDE AND ALBUTEROL SULFATE SCH ML: .5; 3 SOLUTION RESPIRATORY (INHALATION) at 07:10

## 2020-09-06 RX ADMIN — IPRATROPIUM BROMIDE AND ALBUTEROL SULFATE SCH ML: .5; 3 SOLUTION RESPIRATORY (INHALATION) at 15:25

## 2020-09-06 RX ADMIN — INSULIN ASPART SCH UNIT: 100 INJECTION, SOLUTION INTRAVENOUS; SUBCUTANEOUS at 12:40

## 2020-09-06 RX ADMIN — GLIMEPIRIDE SCH MG: 4 TABLET ORAL at 07:29

## 2020-09-06 RX ADMIN — IPRATROPIUM BROMIDE AND ALBUTEROL SULFATE SCH ML: .5; 3 SOLUTION RESPIRATORY (INHALATION) at 11:00

## 2020-09-06 NOTE — P.PN
Subjective


Progress Note Date: 09/06/20


Principal diagnosis: 





Postoperative hypoxemic/hypercapnic respiratory failure





This is a 60-year-old female, recently discovered to have a 7.3 cm right sided 

adrenal mass concerning for renal cell carcinoma.  Patient underwent robotic-

assisted right sided radical nephrectomy and lyses of adhesions yesterday.  

However post extubation, patient required to be placed on BiPAP, and could not 

be switched to a nasal cannula.  Multiple attempts were made in the recovery 

room, then I was notified by the surgeon about this concern, I recommended that 

the patient remains on BiPAP, recommended transferring the patient to the ICU, 

and to hold Robaxin, continue Dilaudid.  Overnight the patient was placed on 

BiPAP at 15/5 and 40% FiO2.  Patient did extremely well through the night, and 

after I evaluated the patient this morning, I switch her to a nasal cannula at 2

L.  And Her O2 saturation just above 90%.  Patient is morbidly obese, she has 

history of chronic cough for the last 6 months, she has been a 40-pack-year 

smoker but she quit smoking about 5 years ago.  No documented history of 

obstructive sleep apnea no previous sleep study, patient is being evaluated on 

outpatient basis for a 6 month history of cough, and workup is pending supposed 

to have a full PFT in the next few weeks.  Patient is known to have history of 

obesity, diabetes, dyslipidemia, hypertension, degenerative joint disease, 

bipolar disorder.





Reevaluated today on 9/5/20, patient remains as an overflow in the ICU.  Patient

is doing great, asymptomatic.  Denies any cough no wheezing no shortness of br

eath.  Patient is now at 2 L nasal cannula, she had a low-grade temp of 100.2 

yesterday.  Chest x-ray showed minimal bibasilar atelectasis.  Patient is 

compliant with her incentive spirometry, and she is becoming more active.  IV 

fluids have been discontinued, and I still plan to transfer the patient to a 

regular medical floor today, possible discharge planning in the next 24-48 hours

once cleared by urology.  CBC is normal index was abnormal renal profile showed 

slight rise in creatinine to 1.22, baseline is 1.10 before her nephrectomy.





The patient is seen today 09/06/2020 in follow-up on the regular medical floor. 

She is currently resting comfortably in bed.  Awake and alert in no acute 

distress.  Denies any worsening shortness of breath, cough or congestion.  She 

is maintaining O2 saturations in the low 90s on 1 L/m per nasal cannula.  She's 

been afebrile.  Hemodynamically stable.  White count 8.8.  Hemoglobin 11.9.  

Sodium 138.  Potassium 4.1.  Creatinine 1.08.  Continue bronchodilators.  

Working well with the incentive spirometer.





Objective





- Vital Signs


Vital signs: 


                                   Vital Signs











Temp  97.8 F   09/06/20 05:20


 


Pulse  92   09/06/20 11:14


 


Resp  20   09/06/20 05:20


 


BP  124/77   09/06/20 05:20


 


Pulse Ox  92 L  09/06/20 05:20








                                 Intake & Output











 09/05/20 09/06/20 09/06/20





 18:59 06:59 18:59


 


Intake Total 560  


 


Balance 560  


 


Weight 123.3 kg  


 


Intake:   


 


    


 


    Sodium Chloride 0.9% 1, 160  





    000 ml @ 125 mls/hr IV .   





    Q8H [facility] Rx#:[id number]   


 


  Oral 400  


 


Other:   


 


  Voiding Method Toilet Toilet Toilet


 


  # Voids 3 2 


 


  # Emeses   1














- Exam





Physical Exam: Revealed a morbidly obese pleasant 60-year-old female in no 

distress.  On 1 L nasal cannula.


Head: Atraumatic, normocephalic.


HEENT:[Neck is supple.] [No neck masses.] [No thyromegaly.] [No JVD.]  

Mallampati class III.


Chest: [Symmetrical chest expansion, faint crackles, diminished at the bases no 

rhonchi and no wheezes


Cardiac Exam: Distant S1 and S2. no S3 gallop, no murmur.]


Abdomen: Obese, [Soft, nontender,  no megaly, no rebound, no guarding, normal 

bowel sounds.]


Extremities: [No clubbing, no edema, no cyanosis.]  Good pulses bilaterally.


Neurological Exam: [No focal neurologic deficit.]  Alert oriented 3 focal 

deficits.


Psychiatric: Normal mood affect and normal mental status examination.


Skin: No rashes.


Musculoskeletal no deformities noted limitation range of motion.


Lymphatics: No lymphadenopathy cervical or supraclavicular





- Labs


CBC & Chem 7: 


                                 09/06/20 06:22





                                 09/06/20 06:22


Labs: 


                  Abnormal Lab Results - Last 24 Hours (Table)











  09/05/20 09/05/20 09/05/20 Range/Units





  11:44 16:40 20:17 


 


Carbon Dioxide     (22-30)  mmol/L


 


Creatinine     (0.52-1.04)  mg/dL


 


Glucose     (74-99)  mg/dL


 


POC Glucose (mg/dL)  307 H  169 H  196 H  (75-99)  mg/dL


 


Calcium     (8.4-10.2)  mg/dL














  09/06/20 09/06/20 09/06/20 Range/Units





  06:22 07:04 11:21 


 


Carbon Dioxide  33 H    (22-30)  mmol/L


 


Creatinine  1.08 H    (0.52-1.04)  mg/dL


 


Glucose  219 H    (74-99)  mg/dL


 


POC Glucose (mg/dL)   218 H  283 H  (75-99)  mg/dL


 


Calcium  8.3 L    (8.4-10.2)  mg/dL














Assessment and Plan


Assessment: 





Status post right sided robotic-assisted nephrectomy postoperative day #3





Postoperative hypoxic and hypercapnic respiratory failure, unexpected, mostly 

related to obesity/hypoventilation syndrome and obstructive sleep apnea syndrome

is strongly suspected.  With postoperative atelectasis.





Suspect some component of COPD, likely mild considering the patient had a 

40-pack-year smoking history.  Again doubt being active COPD.





Obstructive sleep apnea syndrome is strongly suspected, patient will need to 

have outpatient sleep study.





Morbid obesity





Type 2 diabetes.





Degenerative joint disease





Benign essential hypertension





History of bipolar disorder.





Ex-smoker, quit smoking 5 years ago.





 chronic cough, being investigated on outpatient basis.





Plan:





The patient was seen and evaluated by Dr. Arellano


She may require home oxygen for 1-2 weeks'


Continue bronchodilators


Continue the incentive spirometer


Probable obstructive sleep apnea needs to be worked up in the outpatient setting


Home once cleared by nephrology.





I, the cosigning physician, performed a history & physical examination of the 

patient. Lungs sounds with faint crackles in the bilateral posterior bases.  

Maintaining good O2 saturations in the 90s on 1 L/m per nasal canula  I 

discussed the assessment and plan of care with my nurse practitioner, Sapphire Yoon. I attest to the above note as dictated by her.

## 2020-09-06 NOTE — P.PN
Subjective





This is a pleasant 60 years old female who was admitted for 7.3 cm right-sided 

renal mass concerning for renal cell carcinoma.  Diabetes mellitus, h

ypertension, osteoarthritis.  Also patient with history of bipolar depression 

and schizoaffective disorder


Patient is a status post robotic-assisted right side to radical nephrectomy with

lysis of adhesions.  Today is postoperative day #1








Vitas looks stable.


UNREMARKABLE CBC AND BMP WITH CREATININE 1.1 AND sugar is controlled.  Chest x-

ray low lung volume suspected interstitial pulmonary edema.


CT of the abdomen and pelvis with contrast from 7/27 showing the right kidney 

mass.  CT of the chest 7/17: Small focus of groundglass infiltrate in the region

of the lingula correlated for inflammatory process.  Basilar parenchymal 

scarring


Patient is currently on sodium chloride at 1 25 mL/h and is also on metformin 

and Amaryl.








9/5/20


Patient is awake and remains in the ICU for close monitoring now but she 

selective overflow.  Patient has no dyspnea or coughing or chest pain.  She 

saturating 92% on 2 L oxygen via nasal cannula, Earl catheter was discontinued 

per urology service recommendation and patient is able to be with no problem, 

she denies urgency or increased frequency or burning patient is hemodynamically 

stable.  Patient is requesting to move around.  Her vertical wound on the right 

side and 2 puncture wounds look so close, dry and healing





9/6/20


Patient is awake and alert, no specific complaints, no chest pain or dyspnea, no

abdominal pain, not complaints about her bowel movements are voiding ability, no

fever


Sugar is better controlled, her creatinine is trending down to 1.0.  Still 

recommended to hold metformin given her kidney problems and renal system illness

which might drop her creatinine up, we recommend to continue with the glipizide 

and the nicotine 2.5 mg which can be increased to 5 mg, as he needed more units 

of short-acting insulin


Patient site within one week and she was instructed with the same





Objective





- Vital Signs


Vital signs: 


                                   Vital Signs











Temp  99.0 F   09/06/20 12:34


 


Pulse  96   09/06/20 15:36


 


Resp  20   09/06/20 12:34


 


BP  101/55   09/06/20 12:34


 


Pulse Ox  84 L  09/06/20 12:44








                                 Intake & Output











 09/05/20 09/06/20 09/06/20





 18:59 06:59 18:59


 


Intake Total 560  150


 


Balance 560  150


 


Weight 123.3 kg  


 


Intake:   


 


    


 


    Sodium Chloride 0.9% 1, 160  





    000 ml @ 125 mls/hr IV .   





    Q8H [facility] Rx#:[id number]   


 


  Oral 400  150


 


Other:   


 


  Voiding Method Toilet Toilet Toilet


 


  # Voids 3 2 


 


  # Emeses   1














- Exam





GENERAL: The patient is alert and oriented x3, not in any acute distress.  Obese


HEENT: Pupils are round and equally reacting to light. EOMI. No scleral icterus.

No conjunctival pallor. Normocephalic, atraumatic. No pharyngeal erythema. No 

thyromegaly. 


CARDIOVASCULAR: S1 and S2 present. No murmurs, rubs, or gallops. 


PULMONARY: Chest is clear to auscultation, no wheezing or crackles. 


ABDOMEN: Soft, nontender, nondistended, normoactive bowel sounds. No palpable 

organomegaly. 


MUSCULOSKELETAL: No joint swelling or deformity. 


EXTREMITIES: No cyanosis, clubbing, or pedal edema. 


NEUROLOGICAL: Gross neurological examination did not reveal any focal deficits. 


SKIN: No rashes. no petechiae.





- Labs


CBC & Chem 7: 


                                 09/06/20 06:22





                                 09/06/20 06:22


Labs: 


                  Abnormal Lab Results - Last 24 Hours (Table)











  09/05/20 09/06/20 09/06/20 Range/Units





  20:17 06:22 07:04 


 


Carbon Dioxide   33 H   (22-30)  mmol/L


 


Creatinine   1.08 H   (0.52-1.04)  mg/dL


 


Glucose   219 H   (74-99)  mg/dL


 


POC Glucose (mg/dL)  196 H   218 H  (75-99)  mg/dL


 


Calcium   8.3 L   (8.4-10.2)  mg/dL














  09/06/20 09/06/20 Range/Units





  11:21 17:05 


 


Carbon Dioxide    (22-30)  mmol/L


 


Creatinine    (0.52-1.04)  mg/dL


 


Glucose    (74-99)  mg/dL


 


POC Glucose (mg/dL)  283 H  130 H  (75-99)  mg/dL


 


Calcium    (8.4-10.2)  mg/dL














Assessment and Plan


Assessment: 





Right sided kidney mass concerning for renal cell carcinoma


Acute hypoxemic hypercapnic respiratory failure suspected due to obesity 

hypoventilation syndrome and obstructive sleep apnea


Diabetes mellitus


Hypertension


Osteoarthritis.


History of bipolar depression and schizoaffective disorder, not an active issue.











Plan: 





This is a pleasant 60 years old female who presented for right  renal mass co

ncerning for RCC.  Continue with pain management, Continue with oxygen and 

bronchodilator as needed.  Pulmonary consult.


Hold metformin.  Continue with Amaryl.  And the nicotine 5 mg daily.  We will k

eep following continue with insulin sliding scale


Labs and medication were reviewed..  Continue same treatment.  Continue with 

symptomatic treatment.  Resume home medication


Monitor lytes and vitals.  DVT and GI prophylaxis.  Further recommendations of 

the clinical course of the patient.  We will follow up with you and the patient


DVT prophylaxis: Subcutaneous heparin


GI Prophylaxis: Pepcid thank you for consulting us

## 2020-09-06 NOTE — P.DS
Providers


Date of admission: 


09/03/20 10:45





Attending physician: 


Brando Kenney MD





Consults: 





                                        





09/03/20 17:30


Consult Physician Urgent 


   Consulting Provider: Anthony Arellano


   Consult Reason/Comments: ACUTE POST OP RESP ISSUES


   Do you want consulting provider notified?: Yes





09/03/20 18:32


Consult Physician Stat 


   Consulting Provider: Carolee Pascual


   Consult Reason/Comments: Hospital management


   Do you want consulting provider notified?: Already Contacted











Primary care physician: 


[facility] Course: 





Ms Cortez is a 61 yo female with hx of right sided renal mass, she underwent a 

robotic assisted nephrectomy on 9/3/20, no complication during surgery, please 

see op note dated 9/2 for surgery details. Post operatively patient required 

BiPaP and was admitted to the ICU for close monitoring. Pulmonology was 

consulted during her admission. lopez was removed on POD #1 .She was transfered 

to the GPU on POD #2. She was discharged home on POD #3, at time of discharge 

she was tolerating a diet, ambulating and pain was controlled. She was 

discharged home on an oxygen script.     





Plan - Discharge Summary


Discharge Rx Participant: Yes


New Discharge Prescriptions: 


New


   methocarbamoL [Robaxin] 750 mg PO TID #21 tab


   traMADol HCL [Ultram] 50 mg PO Q6HR PRN 3 Days #12 tab


     PRN Reason: Pain





No Action


   metFORMIN HCL [Glucophage] 1,000 mg PO AC-SUPPER


   hydroCHLOROthiazide [Hydrodiuril] 25 mg PO DAILY


   Atorvastatin [Lipitor] 20 mg PO HS


   ARIPiprazole [Abilify] 10 mg PO HS


   Glimepiride [Amaryl] 4 mg PO AC-BRKFST


Discharge Medication List





ARIPiprazole [Abilify] 10 mg PO HS 08/27/19 [History]


Atorvastatin [Lipitor] 20 mg PO HS 08/27/19 [History]


hydroCHLOROthiazide [Hydrodiuril] 25 mg PO DAILY 08/27/19 [History]


metFORMIN HCL [Glucophage] 1,000 mg PO AC-SUPPER 08/27/19 [History]


Glimepiride [Amaryl] 4 mg PO AC-BRKFST 08/28/20 [History]


methocarbamoL [Robaxin] 750 mg PO TID #21 tab 09/06/20 [Rx]


traMADol HCL [Ultram] 50 mg PO Q6HR PRN 3 Days #12 tab 09/06/20 [Rx]








Follow up Appointment(s)/Referral(s): 


Anthony Arellano MD [STAFF PHYSICIAN] - 2 Weeks (for pulmonary function test as 

outpatient)


Jovany Vidal MD [REFERRING] - 1 Week (endocrinologist for your DM)


Reta Felton MD [Primary Care Provider] - 1 Week


Activity/Diet/Wound Care/Special Instructions: 


No heavy lifting or straining for 6 weeks 


You may shower, but no baths for 4 weeks

## 2021-03-09 ENCOUNTER — HOSPITAL ENCOUNTER (OUTPATIENT)
Dept: HOSPITAL 47 - RADCTMAIN | Age: 61
End: 2021-03-09
Attending: UROLOGY
Payer: MEDICARE

## 2021-03-09 DIAGNOSIS — C64.9: Primary | ICD-10-CM

## 2021-03-09 PROCEDURE — 71046 X-RAY EXAM CHEST 2 VIEWS: CPT

## 2021-03-09 PROCEDURE — 74150 CT ABDOMEN W/O CONTRAST: CPT

## 2021-03-09 NOTE — CT
EXAMINATION TYPE: CT abdomen wo con

 

DATE OF EXAM: 3/9/2021

 

COMPARISON: CT abdomen pelvis 7/27/2020

 

HISTORY: Renal cancer

 

CT DLP: 878 mGycm

Automated exposure control for dose reduction was used.

 

TECHNIQUE:  Helical acquisition of images was performed from the lung bases through the top of iliac 
crest to include entire abdomen. Patient received oral contrast only

 

CONTRAST: 

Performed with Oral Contrast and without IV contrast.

 

FINDINGS: There is some coronary artery calcifications present, small hiatal hernia. Lack of contrast
 may decrease sensitivity. 

 

LUNG BASES: Some minimal increased attenuation present along the right hemidiaphragm is again noted, 
some minimal air bronchograms are present which may reflect scarring rather than pneumonia or atelect
asis, similar to prior exam.

 

LIVER/GB: Patient is post cholecystectomy. The liver is enlarged. Focal low attenuation area adjacent
 to the gallbladder fossa, axial image 34 within the liver is likely present on prior exam

 

PANCREAS: No significant abnormality is seen.

 

SPLEEN: No significant abnormality is seen.

 

ADRENALS: No significant abnormality is seen.

 

KIDNEYS: Right kidney is absent, left kidney is measuring approximately 11.8 cm, no hydronephrosis or
 evident pathologic calcification. Post op changes. 

 

 

BOWEL:  No significant abnormality is seen.

 

LYMPH NODES:  No significant abnormality is appreciated.

 

OSSEOUS STRUCTURES:  Degenerative disk disease at L5-S1, facet arthropathy.

 

FREE AIR:  No Free Air visible

 

ASCITES:  None visible.

 

RETROPERITONEAL ADENOPATHY:  No Retroperitoneal Adenopathy visible.

 

OTHER:

 

IMPRESSION: 

POSTOP CHANGES

## 2021-03-09 NOTE — XR
EXAMINATION TYPE: XR chest 2V

 

DATE OF EXAM: 3/9/2021

 

COMPARISON: Chest x-ray 9/3/2020, CT abdomen 3/9/2021

 

HISTORY: Renal cancer

 

TECHNIQUE:  Frontal and lateral views of the chest are obtained.

 

FINDINGS:  Some patchy basilar density is present along the right hemidiaphragm, right hemidiaphragm 
remains elevated, improved aeration at the left lung base, no pleural effusion or pneumothorax seen. 
Apical pleural thickening shows a stable appearance. The cardiac silhouette size is within normal samson
its.   The osseous structures are intact.

 

IMPRESSION:  Correlate for basilar atelectasis, scarring, right lower lobe pneumonia not excluded.

## 2022-05-20 ENCOUNTER — HOSPITAL ENCOUNTER (OUTPATIENT)
Dept: HOSPITAL 47 - RADCTMAIN | Age: 62
Discharge: HOME | End: 2022-05-20
Attending: UROLOGY
Payer: MEDICARE

## 2022-05-20 DIAGNOSIS — R55: ICD-10-CM

## 2022-05-20 DIAGNOSIS — C64.1: Primary | ICD-10-CM

## 2022-05-20 PROCEDURE — 74176 CT ABD & PELVIS W/O CONTRAST: CPT

## 2022-05-20 PROCEDURE — 71046 X-RAY EXAM CHEST 2 VIEWS: CPT

## 2022-05-20 NOTE — XR
EXAMINATION TYPE: XR chest 2V

 

DATE OF EXAM: 5/20/2022

 

COMPARISON: 3/9/2021

 

TECHNIQUE: PA and lateral views submitted.

 

HISTORY: Follow-up renal cancer

 

FINDINGS:

Right basilar subsegmental consolidation similar to the prior exam. Heart prominent there is likely a
 degree of underlying chronic interstitial lung disease and COPD. Biapical pleural thickening. Arthro
shyam of the shoulders. Hypertrophic and degenerative change of the spine.

 

IMPRESSION:

1. Right basilar atelectasis or infiltrate stable from prior exam.

## 2022-05-20 NOTE — CT
EXAMINATION TYPE: CT abdomen pelvis wo con

 

DATE OF EXAM: 5/20/2022

 

COMPARISON: 3/9/2021

 

HISTORY: h/o renal CA, f/u

 

CT DLP: 2264.5 mGycm

Automated exposure control for dose reduction was used.

 

TECHNIQUE:  Helical acquisition of images was performed from the lung bases through the pelvis.

 

FINDINGS: 

 

LUNG BASES: Subsegmental linear changes at both lung bases.

 

LIVER/GB: Postcholecystectomy changes noted.

 

PANCREAS: No significant abnormality is seen.

 

SPLEEN: No significant abnormality is seen.

 

ADRENALS: No significant abnormality is seen.

 

KIDNEYS: Right kidney is absent, left kidney is measuring approximately 11.8 cm, no hydronephrosis or
 evident pathologic calcification. Lack of contrast limits assessment for mass.. 

 

ADENOPATHY:  None visualized.

 

OSSEOUS STRUCTURES:  Hypertrophic and degenerative changes of the spine. A faint lucencies within a c
ouple vertebral segments are stable from prior exam and too small to characterize. Facet arthropathy 
noted.

 

BOWEL:  No significant abnormality is seen.

 

OTHER: Atherosclerotic change of the aorta. Nonspecific vague 1.5 cm attenuation adjacent to the lowe
r margin of the liver medially. Right lateral defect anterior abdominal wall likely in the basis of p
rior surgery.

 

IMPRESSION:

1. Postsurgical changes. Exam is limited by lack of contrast in assessment for mass. No obvious adeno
shyam. Within the renal bed there is faint ill-defined attenuation which is too small to characterize
 and nonspecific. However, this was not present on the prior exam. Recommend short-term follow-up.

## 2022-08-31 ENCOUNTER — HOSPITAL ENCOUNTER (OUTPATIENT)
Dept: HOSPITAL 47 - LABPAT | Age: 62
Discharge: HOME | End: 2022-08-31
Attending: ORTHOPAEDIC SURGERY
Payer: MEDICARE

## 2022-08-31 DIAGNOSIS — M17.11: ICD-10-CM

## 2022-08-31 DIAGNOSIS — Z22.322: Primary | ICD-10-CM

## 2022-08-31 PROCEDURE — 87070 CULTURE OTHR SPECIMN AEROBIC: CPT

## 2022-09-05 NOTE — P.HPOR
History of Present Illness


H&P Date: 22


Chief Complaint: Right knee pain





The patient is a 62-year-old female who presents with progressive right knee 

pain over the past several years worsening over the past 6 months.  She notes 

diffuse pain increases with standing and walking.  She notes daily pain that 

limits her normal function and activities.  She had a previous arthroscopy 25 

years ago.  She's been working on weight loss, but has difficult time 

exercising.  She is unable to take anti-inflammatories because of a previous 

nephrectomy.





Review of Systems





As per HPI





Past Medical History


Past Medical History: Cancer, Diabetes Mellitus, Hyperlipidemia, Hypertension, 

Osteoarthritis (OA), Sleep Apnea/CPAP/BIPAP


Additional Past Medical History / Comment(s): no cpap used currently, hx kidney 

cancer, oxygen continuous at 1L NC


History of Any Multi-Drug Resistant Organisms: None Reported


Past Surgical History: Cholecystectomy, Heart Catheterization, Hysterectomy


Additional Past Surgical History / Comment(s): right oophorectomy prior to 

hysterectomy.  arthroscopy rt knee. surgery for right ankle fx , rt nephrectomy.


Past Anesthesia/Blood Transfusion Reactions: Previous Problems w/ Anesthesia, 

Family History of Problems w/ Anesthesia


Additional Past Anesthesia/Blood Transfusion Reaction / Comment(s): "respiratory

distress in recovery after kidney surgery due to sleep apnea"Per Pulmonology 

clearance(states may need bipap postop-had to be extubated to bipap post kidney 

surgery & was in the ICU), sister developed PE after surgery


Past Psychological History: Bipolar, Depression, Schizoaffective Disorder


Smoking Status: Former smoker


Past Alcohol Use History: None Reported


Additional Past Alcohol Use History / Comment(s): quit smoking 9 yrs ago, smoked

for 35 yrs


Past Drug Use History: None Reported





- Past Family History


  ** Father


Family Medical History: Myocardial Infarction (MI)


Additional Family Medical History / Comment(s): Father has  and patient does

not know cause.  Apparently mother is alive at age 81.  Patient has no sisters 

and no brothers.  Family history of bipolar.  Patient has 1 daughter and 1 son.





  ** Sister(s)


Family Medical History: Pulmonary Embolus





Medications and Allergies


                                Home Medications











 Medication  Instructions  Recorded  Confirmed  Type


 


Glimepiride [Amaryl] 4 mg PO [id number] 20 History


 


ARIPiprazole [Abilify] 10 mg PO HS 22 History


 


Losartan Potassium [Cozaar] 25 mg PO QAM 22 History


 


Metoprolol Tartrate [Lopressor] 25 mg PO QAM 22 History


 


Rosuvastatin [Crestor] 20 mg PO HS 22 History








                                    Allergies











Allergy/AdvReac Type Severity Reaction Status Date / Time


 


No Known Allergies Allergy   Verified 22 17:09














Physical Examination





- Knee


  ** right


Appearance: effusion, varus alignment in stance


Effusion grade: grade 1


Tenderness with palpation: medial


Pain: with flexion


ROM: extension: -10 degrees


ROM: flexion: 90 degrees


Strength: extension: 5/5


Strength: flexion: 5/5


Meniscal tests: medial meniscal tests: positive





Results





The patient is a well-developed well-nourished female of endomorphic habitus.  

HEENT exam is nonfocal.  Neck is supple.  She has painless passive motion of the

 right hip.  Straight leg raise is negative.  Her right knee is stable to varus 

and valgus stress.  She has an antalgic gait pattern.  Her distal neurovascular 

appears intact in the right lower extremity.





- Diagnostic results


Knee x-ray: image reviewed (3 views of the right knee obtained in the office 

show severe medial compartment osteoarthrosis with bone-on-bone changes and 

subchondral sclerosis.)





Assessment and Plan


Assessment: 





Right knee severe tricompartmental osteoarthrosis


Obesity


Diabetes


History of renal cell carcinoma


Plan: 





I talked to the patient at length regarding her condition along with treatment 

options.  At this point she is quite limited because of pain related to her 

osteoarthrosis despite previous conservative measures.  After thorough 

discussion she opted to proceed with surgery.  We will plan to proceed with 

right total knee arthroplasty.  Risks and benefits are discussed at length in 

layman's terms.  She underwent preoperative medical and cardiac clearance.  We 

will institute DVT prophylaxis postoperatively.


Time with Patient: Less than 30

## 2022-09-06 ENCOUNTER — HOSPITAL ENCOUNTER (INPATIENT)
Dept: HOSPITAL 47 - OR | Age: 62
LOS: 3 days | Discharge: NURSING, REHAB OR LONG-TERM CARE | DRG: 470 | End: 2022-09-09
Attending: ORTHOPAEDIC SURGERY | Admitting: ORTHOPAEDIC SURGERY
Payer: MEDICARE

## 2022-09-06 VITALS — BODY MASS INDEX: 46.7 KG/M2

## 2022-09-06 DIAGNOSIS — F25.9: ICD-10-CM

## 2022-09-06 DIAGNOSIS — Z90.49: ICD-10-CM

## 2022-09-06 DIAGNOSIS — J96.11: ICD-10-CM

## 2022-09-06 DIAGNOSIS — G47.33: ICD-10-CM

## 2022-09-06 DIAGNOSIS — E78.5: ICD-10-CM

## 2022-09-06 DIAGNOSIS — Z99.81: ICD-10-CM

## 2022-09-06 DIAGNOSIS — E11.65: ICD-10-CM

## 2022-09-06 DIAGNOSIS — Z90.721: ICD-10-CM

## 2022-09-06 DIAGNOSIS — F31.9: ICD-10-CM

## 2022-09-06 DIAGNOSIS — Z85.528: ICD-10-CM

## 2022-09-06 DIAGNOSIS — I95.81: ICD-10-CM

## 2022-09-06 DIAGNOSIS — J45.909: ICD-10-CM

## 2022-09-06 DIAGNOSIS — Z90.5: ICD-10-CM

## 2022-09-06 DIAGNOSIS — Z91.81: ICD-10-CM

## 2022-09-06 DIAGNOSIS — M17.11: Primary | ICD-10-CM

## 2022-09-06 DIAGNOSIS — E66.01: ICD-10-CM

## 2022-09-06 DIAGNOSIS — I10: ICD-10-CM

## 2022-09-06 DIAGNOSIS — Z79.84: ICD-10-CM

## 2022-09-06 DIAGNOSIS — J44.9: ICD-10-CM

## 2022-09-06 DIAGNOSIS — Z79.899: ICD-10-CM

## 2022-09-06 DIAGNOSIS — Z90.710: ICD-10-CM

## 2022-09-06 DIAGNOSIS — Z87.891: ICD-10-CM

## 2022-09-06 LAB
GLUCOSE BLD-MCNC: 199 MG/DL (ref 70–110)
GLUCOSE BLD-MCNC: 217 MG/DL (ref 70–110)

## 2022-09-06 PROCEDURE — 85025 COMPLETE CBC W/AUTO DIFF WBC: CPT

## 2022-09-06 PROCEDURE — 94760 N-INVAS EAR/PLS OXIMETRY 1: CPT

## 2022-09-06 PROCEDURE — 88305 TISSUE EXAM BY PATHOLOGIST: CPT

## 2022-09-06 PROCEDURE — 80053 COMPREHEN METABOLIC PANEL: CPT

## 2022-09-06 PROCEDURE — 94660 CPAP INITIATION&MGMT: CPT

## 2022-09-06 PROCEDURE — 88311 DECALCIFY TISSUE: CPT

## 2022-09-06 PROCEDURE — 76942 ECHO GUIDE FOR BIOPSY: CPT

## 2022-09-06 PROCEDURE — 64999 UNLISTED PX NERVOUS SYSTEM: CPT

## 2022-09-06 PROCEDURE — 64448 NJX AA&/STRD FEM NRV NFS IMG: CPT

## 2022-09-06 RX ADMIN — DOCUSATE SODIUM AND SENNOSIDES SCH EACH: 50; 8.6 TABLET ORAL at 22:17

## 2022-09-06 RX ADMIN — INSULIN ASPART SCH UNIT: 100 INJECTION, SOLUTION INTRAVENOUS; SUBCUTANEOUS at 22:16

## 2022-09-06 RX ADMIN — ATORVASTATIN CALCIUM SCH MG: 40 TABLET, FILM COATED ORAL at 23:03

## 2022-09-06 RX ADMIN — POTASSIUM CHLORIDE SCH MLS: 14.9 INJECTION, SOLUTION INTRAVENOUS at 08:54

## 2022-09-06 NOTE — XR
EXAMINATION TYPE: XR knee limited RT

 

DATE OF EXAM: 9/6/2022

 

CLINICAL HISTORY: Right knee pain and arthritis status post total knee replacement.

 

TECHNIQUE:  Portable AP and crosstable lateral views of the right knee are obtained immediately posto
peratively.

 

COMPARISON: Outside right knee x-ray July 18, 2022

 

FINDINGS:  Metallic hardware from total right knee arthroplasty is seen and appears satisfactory in a
lignment and position.  There is evidence of recent surgery with diffuse subcutaneous gas and soft ti
ssue swelling noted. 

 

 

IMPRESSION:  METALLIC HARDWARE FROM TOTAL RIGHT KNEE ARTHROPLASTY IS SATISFACTORY IN ALIGNMENT.

## 2022-09-06 NOTE — P.ANPRN
Procedure Note - Anesthesia





- Nerve Block Performed


  ** Right Adductor Canal Infusion


Time Out Performed: Yes (0936)


Date of Procedure: 09/06/22


Procedure Start Time: 09:37


Procedure Stop Time: 09:42


Location of Patient: PreOp


Indication: Acute Post-Operative Pain, Requested by Surgeon


Specifically requested for management of pain by : Juan Pablo Jimenez


Sedation Type: Sedate with meaningful contact maintained


Preparation: Sterile Prep, Sterile Dressing


Position: Supine


Catheter Depth at Skin (cm): 8


Catheter: Indwelling


Needle Types: Pajunk


Needle Gauge: 18


Ultrasound used to visualize needle placement: Yes


Ultrasound used to observe medication spread: Yes


Injectate: 0.5% Ropivacaine (see comment for volume) (15cc + 5cc nacl pf)


Blood Aspirated: No


Pain Paresthesia on Injection Noted: No


Resistance on Injection: Normal


Image Stored and Saved: Yes


Events: Uneventful and Well Tolerated

## 2022-09-06 NOTE — P.ANPRN
Procedure Note - Anesthesia





- Nerve Block Performed


  ** Right iPack Single


Time Out Performed: Yes (0936)


Date of Procedure: 09/06/22


Procedure Start Time: 09:43


Procedure Stop Time: 09:47


Location of Patient: PreOp


Indication: Acute Post-Operative Pain, Requested by Surgeon


Specifically requested for management of pain by DrMega: Juan Pablo Jimenez


Sedation Type: Sedate with meaningful contact maintained


Preparation: Sterile Prep


Position: Supine


Catheter: None


Needle Types: Pajunk


Needle Gauge: 21


Ultrasound used to visualize needle placement: Yes


Ultrasound used to observe medication spread: Yes


Injectate: 0.5% Ropivacaine (see comment for volume) (15cc + 5cc nacl pf)


Blood Aspirated: No


Pain Paresthesia on Injection Noted: No


Resistance on Injection: Normal


Image Stored and Saved: Yes


Events: Uneventful and Well Tolerated

## 2022-09-06 NOTE — P.OP
Date of Procedure: 09/06/22


Preoperative Diagnosis: 


Right knee severe tricompartmental osteoarthrosis


Postoperative Diagnosis: 


Same


Procedure(s) Performed: 


Right total knee arthroplastycementedposterior stabilized


Implants: 


Depuy Attune sinus 5 narrow cemented femoral component, size 4 cemented tibial 

component, 10 mm articular surface, 32 mm cemented patellar component.


Anesthesia: regional, spinal


Surgeon: Juan Pablo Jimenez


Assistant #1: Buzz Jang


Estimated Blood Loss (ml): 50


Pathology: other (Bone fragments)


Condition: stable


Disposition: PACU


Indications for Procedure: 


The patient's a 62-year-old female who presents with progressive right knee pain

secondary to osteoarthritis despite conservative measures.  A discussion of the 

risks and benefits of operative intervention versus continued conservative 

measures was made with patient.  She opted to proceed with surgery.  Operative 

risks to include infection, fracture, development of blood clots, possible 

neurovascular injury, possible component loosening/failure need for subsequent p

rocedures was discussed.  Informed consent was obtained.


Operative Findings: 


As below


Description of Procedure: 


The patient was brought to the operating room, and after induction of spinal 

anesthesia the right lower extremity was prepped and draped in a normal fashion.

 The tourniquet was inflated to 270 mm marker.  A longitudinal incision 

extending 3 finger breaths above the superior pole of patella extending to the 

medial aspect the tibial tubercle was then made.  The skin and subcutaneous 

tissues were divided sharply.  Electrocautery was used for hemostasis.  A medial

parapatellar arthrotomy was performed.  The medial soft tissues to include the 

superficial and deep portions of the medial collateral ligament were elevated 

subperiosteally.  The patella was everted.  A portion of the retropatellar fat 

pad was excised sharply.  The anterior cruciate ligament was sacrificed.  Blunt 

retractors were placed.  A starting hole was made in the distal femur 1 cm 

anterior to the posterior cruciate ligament origin.  An intramedullary femoral 

guide was then inserted planning on 5 valgus distal cut with 9 mm distal 

resection.  The cutting block was pinned in place.  The distal cut was then 

made.  The posterior referencing sizing guide was utilized.  I felt size 5 

narrow was most appropriate.  3 of external rotation was built into the system 

and verified off the trans-epicondylar axis and the posterior condyles.  The 

cutting block was pinned in place.  The anterior, posterior, and chamfer cuts 

then made.  Bone fragments were removed.  The intercondylar guide was placed and

the notch cut was made with a sagittal saw.  The bone block was removed in one 

fragment.  The trial component was then placed.  There is good anterior to 

posterior and medial to lateral fit.  The distal peg holes were drilled.  The 

trial component was removed.  Attention was then paid towards preparing the 

proximal femur.  An extra medullary guide was utilized in line with the tibial 

shaft and second metatarsal distally.  I planned on 2 mm resection from the 

medial compartment.  The cutting block was pinned in place.  The proximal tibial

cut was then made.  The bone was removed in one fragment.  The remnants of the 

medial and lateral menisci were excised at the capsular junction with 

electrocautery.  The tibia sized most appropriately at size 4.  The trial 

femoral and tibial components were placed along with a 10 mm articular surface. 

I was able to obtain full flexion and extension with internal and external 

rotation.  After several flexion and extension cycles, the tibial rotation was 

marked with electrocautery line with the medial one third of the tibial 

tubercle.  Attention was then paid towards preparing the patella.  A patella re

amer was utilized taking stem to 14 mm of bone stock.  A good flush cut was 

made.  The patella sized most appropriately 32 mm.  The peg holes were drilled. 

The trial components placed.  I had good patellofemoral tracking with no hands 

technique.  The trial components were then removed.  The tibia was prepared in 

the appropriate rotation with appropriate drill and keel punch.    The posterior

osteophytes were removed with a curved osteotome.  The flexion and extension 

gaps were checked and felt to be symmetric at 10 mm.  A trial components were 

then removed.  The bony surfaces were prepared with pulsatile lavage and dried. 

The tibial component was then cemented place was fully seated.  Excess cement 

was removed.  The femoral component cemented place and was fully seated.  Excess

cement was removed.  The trial 10 mm articular surface was placed and the knee 

was put in full extension.  The patella component was cemented place.  After the

cement had sufficiently hardened, the knee was again taken through a range of 

motion.  Again I was able to obtain full flexion and extension with varus and 

valgus stress.  The trial 10 mm articular surface was removed and the final one 

inserted.  This was fully seated.  Care was taken to avoid any soft tissue 

interposition.  Pulsatile lavage was again utilized.  The medial parapatellar 

arthrotomy was closed with #2 Ethibond suture.  The tourniquet was deflated with

approximately 60 minutes total tourniquet time.   Final hemostasis was obtained 

with the cautery.  There was minimal bleeding therefore a deep drain was not 

placed.  The subcutaneous tissues were reapproximated with interrupted 2-0 

Vicryl sutures.  The skin was reapproximated with 3-0 subcuticular strata fix 

suture.  Skin tape and adhesive was applied.  A sterile dressing was applied.  

The patient was awoken from sedation and transferred to recovery room in good 

condition.  Blood loss was estimated at 50 mL.  No complications were incurred. 

Sponge and needle counts were correct at the end of the case.  Dennys KING 

assisted during the major components of this case to include exposure, bone 

resection, implantation, and closure.

## 2022-09-07 LAB
ALBUMIN SERPL-MCNC: 3.3 G/DL (ref 3.5–5)
ALBUMIN/GLOB SERPL: 1.4 {RATIO}
ALP SERPL-CCNC: 103 U/L (ref 38–126)
ALT SERPL-CCNC: 11 U/L (ref 4–34)
ANION GAP SERPL CALC-SCNC: 9 MMOL/L
AST SERPL-CCNC: 12 U/L (ref 14–36)
BASOPHILS # BLD AUTO: 0.03 X 10*3/UL (ref 0–0.1)
BASOPHILS NFR BLD AUTO: 0.3 %
BUN SERPL-SCNC: 20 MG/DL (ref 7–17)
CALCIUM SPEC-MCNC: 8.2 MG/DL (ref 8.4–10.2)
CHLORIDE SERPL-SCNC: 98 MMOL/L (ref 98–107)
CO2 SERPL-SCNC: 29 MMOL/L (ref 22–30)
EOSINOPHIL # BLD AUTO: 0.01 X 10*3/UL (ref 0.04–0.35)
EOSINOPHIL NFR BLD AUTO: 0.1 %
ERYTHROCYTE [DISTWIDTH] IN BLOOD BY AUTOMATED COUNT: 4.51 X 10*6/UL (ref 4.1–5.2)
ERYTHROCYTE [DISTWIDTH] IN BLOOD: 15.2 % (ref 11.5–14.5)
GLOBULIN SER CALC-MCNC: 2.4 G/DL
GLUCOSE BLD-MCNC: 138 MG/DL (ref 70–110)
GLUCOSE BLD-MCNC: 197 MG/DL (ref 70–110)
GLUCOSE BLD-MCNC: 227 MG/DL (ref 70–110)
GLUCOSE BLD-MCNC: 237 MG/DL (ref 70–110)
GLUCOSE BLD-MCNC: 280 MG/DL (ref 70–110)
GLUCOSE SERPL-MCNC: 254 MG/DL (ref 74–99)
HCT VFR BLD AUTO: 44.6 % (ref 37.2–46.3)
HGB BLD-MCNC: 13.4 G/DL (ref 12–15)
IMM GRANULOCYTES BLD QL AUTO: 0.4 %
LYMPHOCYTES # SPEC AUTO: 0.49 X 10*3/UL (ref 0.9–5)
LYMPHOCYTES NFR SPEC AUTO: 4.5 %
MCH RBC QN AUTO: 29.7 PG (ref 27–32)
MCHC RBC AUTO-ENTMCNC: 30 G/DL (ref 32–37)
MCV RBC AUTO: 98.9 FL (ref 80–97)
MONOCYTES # BLD AUTO: 0.88 X 10*3/UL (ref 0.2–1)
MONOCYTES NFR BLD AUTO: 8 %
NEUTROPHILS # BLD AUTO: 9.51 X 10*3/UL (ref 1.8–7.7)
NEUTROPHILS NFR BLD AUTO: 86.7 %
NRBC BLD AUTO-RTO: 0 /100 WBCS (ref 0–0)
PLATELET # BLD AUTO: 269 X 10*3/UL (ref 140–440)
POTASSIUM SERPL-SCNC: 5 MMOL/L (ref 3.5–5.1)
PROT SERPL-MCNC: 5.7 G/DL (ref 6.3–8.2)
SODIUM SERPL-SCNC: 136 MMOL/L (ref 137–145)
WBC # BLD AUTO: 10.96 X 10*3/UL (ref 4.5–10)

## 2022-09-07 RX ADMIN — INSULIN ASPART SCH UNIT: 100 INJECTION, SOLUTION INTRAVENOUS; SUBCUTANEOUS at 21:51

## 2022-09-07 RX ADMIN — RIVAROXABAN SCH MG: 10 TABLET, FILM COATED ORAL at 08:47

## 2022-09-07 RX ADMIN — GLIMEPIRIDE SCH: 4 TABLET ORAL at 11:15

## 2022-09-07 RX ADMIN — POTASSIUM CHLORIDE SCH: 14.9 INJECTION, SOLUTION INTRAVENOUS at 04:50

## 2022-09-07 RX ADMIN — INSULIN ASPART SCH UNIT: 100 INJECTION, SOLUTION INTRAVENOUS; SUBCUTANEOUS at 08:47

## 2022-09-07 RX ADMIN — HYDROCODONE BITARTRATE AND ACETAMINOPHEN PRN EACH: 7.5; 325 TABLET ORAL at 08:46

## 2022-09-07 RX ADMIN — INSULIN ASPART SCH: 100 INJECTION, SOLUTION INTRAVENOUS; SUBCUTANEOUS at 17:10

## 2022-09-07 RX ADMIN — DOCUSATE SODIUM AND SENNOSIDES SCH EACH: 50; 8.6 TABLET ORAL at 21:51

## 2022-09-07 RX ADMIN — INSULIN ASPART SCH UNIT: 100 INJECTION, SOLUTION INTRAVENOUS; SUBCUTANEOUS at 12:42

## 2022-09-07 RX ADMIN — ATORVASTATIN CALCIUM SCH MG: 40 TABLET, FILM COATED ORAL at 21:51

## 2022-09-07 RX ADMIN — HYDROCODONE BITARTRATE AND ACETAMINOPHEN PRN EACH: 7.5; 325 TABLET ORAL at 14:44

## 2022-09-07 RX ADMIN — METOPROLOL TARTRATE SCH MG: 25 TABLET, FILM COATED ORAL at 03:13

## 2022-09-07 NOTE — P.PN
Subjective


Progress Note Date: 09/07/22


Principal diagnosis: 





Status post right total knee arthroplasty





Patient was evaluated today at bedside, she is resting in her hospital chair.  

Patient states physical therapy is very difficult today due to pain control.  

She became very unsteady on her feet when working with therapy, she was 

utilizing a walker.  Currently she is not expressing any shortness of breath, 

she was earlier during therapy.  She has been utilizing the nasal cannula since 

then.  Currently denies any chest pain.  She's been urinating with no 

difficulties.





Objective





- Vital Signs


Vital signs: 


                                   Vital Signs











Temp  98 F   09/07/22 07:23


 


Pulse  89   09/07/22 07:23


 


Resp  18   09/07/22 07:23


 


BP  131/92   09/07/22 07:23


 


Pulse Ox  93 L  09/07/22 07:32


 


FiO2      








                                 Intake & Output











 09/06/22 09/07/22 09/07/22





 18:59 06:59 18:59


 


Intake Total 1050  296


 


Output Total 50  


 


Balance 1000  296


 


Weight 122.5 kg 122.5 kg 


 


Intake:   


 


  IV 1050  


 


  Oral   296


 


Output:   


 


  Estimated Blood Loss 50  


 


Other:   


 


  Voiding Method   Toilet


 


  # Voids  1 














- Exam





Right lower extremity:





Incision is clean, dry, and intact.  The exofin fusion tape is in good 

condition.  There is minimal soft tissue swelling and ecchymosis surrounding the

medial and lateral aspects of the incision.  Calf is soft, no tenderness with 

palpation.  Plantar flexion, dorsiflexion, EHL, FHL are intact.  Sensory exam to

light touch throughout the extremity is intact, dorsal pedis pulses 2+.








- Labs


Labs: 


                  Abnormal Lab Results - Last 24 Hours (Table)











  09/06/22 09/07/22 09/07/22 Range/Units





  21:41 02:23 06:54 


 


POC Glucose (mg/dL)  199 H  197 H  280 H  ()  mg/dL














Assessment and Plan


Assessment: 





Postoperative day #1 status post right total knee arthroplasty


Plan: 





Pain control, had a long discussion today with patient regarding use of both the

oral and IV medications needed.





DVT prophylaxis, continue Xarelto during hospital stay





Encourage incentive spirometer





Icing and elevating techniques discussed





Continue PT/OT, weight-bear as tolerated with walker





Medical recommendations





Discharge planning: Had a long discussion with patient today regarding discharge

and the possibility of subacute rehab.  Patient feels that with better pain 

control she will be able to progress her activities.  We will reassess on 

09/08/2020








Time with Patient: Less than 30

## 2022-09-07 NOTE — P.PN
Progress Note - Text


Progress Note Date: 09/07/22


patient was seen and evaluated at bedside.     Status post postoperative day 1 

for right total knee arthroplasty patient had adductor canal catheter for postop

pain control.  Patient rated  pain at rest 6 out of 10 in severity.  Patient 

describes  pain is aching, throbbing type on the sides of the  knee and back of 

the  knee.  Patient started walking with support.  With activity patient pain 

levels are 7-9 out of 10 in severity.  With the help of oral pain medications  

pain levels are tolerable.   Patient denied any weakness/ numbness in  lower 

extremities. patient denied any fever, pain over the catheter site.





Physical exam: 


Patient vital signs stable


Patient is alert awake oriented 3 responding to all questions appropriately


Examination of the catheter site showed  dressing intact,  no leaking fluid 

around the catheter, no redness, no tenderness over the catheter insertion area.





plan:





status post postoperative day 1 for right  total knee arthroplasty with adductor

canal catheter for pain control. Patient was discussed to continue the 

medication  at the rate of 8 mL per hour until the pump is completely empty and 

instructed the patient how to discontinue the catheter.

## 2022-09-07 NOTE — P.CONS
History of Present Illness





- Reason for Consult


Consult date: 22


Medical management





- Chief Complaint


Status post right total knee arthroplasty





- History of Present Illness





Patient is a 62-year-old female with a known history of hypertension, 

hyperlipidemia, osteoarthritis, obstructive sleep apnea not on CPAP, asthma/COPD

on home oxygen 1 L via nasal cannula and bipolar disorder, schizoaffective 

disorder and previous history of smoking was admitted to the hospital for 

elective right total knee arthroplasty.  Patient has been having severe right 

knee pain and x-ray showed tricompartmental osteoarthrosis.  Patient tolerated 

the procedure very well.  Currently patient is on  adductor canal catheter for 

pain control.


Patient denied any complaints of chest pain or shortness of breath.  No nausea 

vomiting abdominal pain or diarrhea.


No cough or sputum production.  No headache or dizziness or lightheadedness.


Postoperatively patient was hypotensive with blood pressure down to 96/56.


Laboratory test showed WBC 10.9 hemoglobin 13.4 and platelets 269


Sodium 136 potassium 5.0 chloride 98 bicarb is 29 BUN 20 and creatinine 1.1 and 

blood sugar is 254 albumin 3.3








Review of Systems





Constitutional: Patient denies any fever or chills . no Generalized weakness.


Abdomen: Patient denied any nausea or vomiting or abd. pain


Cardiovascular: Patient denies any chest pain or short of breath no 

palpitations.


Respiratory: patient denied any cough is from production.  No shortness of 

breath


Neurologic: Patient denied any numbness or tingling headache.


Musculoskeletal: Patient denies any complaints of joint swelling or deformity.


Skin: Negative


Psychiatric: Negative


Endocrine: No heat or cold intolerance.  No recent weight gain.


Genitourinary: No dysuria or hematuria.


All other 14 point ROS negative except the above





Past Medical History


Past Medical History: Cancer, Diabetes Mellitus, Hyperlipidemia, Hypertension, 

Osteoarthritis (OA), Sleep Apnea/CPAP/BIPAP


Additional Past Medical History / Comment(s): no cpap used currently, hx kidney 

cancer, oxygen continuous at 1L NC


History of Any Multi-Drug Resistant Organisms: None Reported


Past Surgical History: Cholecystectomy, Heart Catheterization, Hysterectomy


Additional Past Surgical History / Comment(s): right oophorectomy prior to hys

terectomy.  arthroscopy rt knee. surgery for right ankle fx , rt nephrectomy.


Past Anesthesia/Blood Transfusion Reactions: Previous Problems w/ Anesthesia, 

Family History of Problems w/ Anesthesia


Additional Past Anesthesia/Blood Transfusion Reaction / Comm: "respiratory 

distress in recovery after kidney surgery due to sleep apnea"Per Pulmonology c

learance(states may need bipap postop-had to be extubated to bipap post kidney 

surgery & was in the ICU), sister developed PE after surgery


Past Psychological History: Bipolar, Depression, Schizoaffective Disorder


Smoking Status: Former smoker


Past Alcohol Use History: None Reported


Additional Past Alcohol Use History / Comment(s): quit smoking 9 yrs ago, smoked

for 35 yrs


Past Drug Use History: None Reported





- Past Family History


  ** Father


Family Medical History: Myocardial Infarction (MI)


Additional Family Medical History / Comment(s): Father has  and patient does

not know cause.  Apparently mother is alive at age 81.  Patient has no sisters 

and no brothers.  Family history of bipolar.  Patient has 1 daughter and 1 son.





  ** Sister(s)


Family Medical History: Pulmonary Embolus





Medications and Allergies


                                Home Medications











 Medication  Instructions  Recorded  Confirmed  Type


 


Glimepiride [Amaryl] 4 mg PO [id number] 20 History


 


ARIPiprazole [Abilify] 10 mg PO  22 History


 


Losartan Potassium [Cozaar] 25 mg PO [facility] 22 History


 


Metoprolol Tartrate [Lopressor] 25 mg PO [facility] 22 History


 


Rosuvastatin [Crestor] 20 mg PO  22 History








                                    Allergies











Allergy/AdvReac Type Severity Reaction Status Date / Time


 


No Known Allergies Allergy   Verified 22 08:55














Physical Exam


Vitals: 


                                   Vital Signs











  Temp Pulse Pulse Resp BP Pulse Ox


 


 22 13:39  97.9 F  90   17  93/62  92 L


 


 22 07:32       93 L


 


 22 07:23  98 F  89   18  131/92  92 L


 


 22 04:19    104 H   


 


 22 03:13   115 H     93 L


 


 22 02:00  99.2 F  125 H   30 H  109/56  98


 


 22 19:30  97.9 F  94   18  96/56  98








                                Intake and Output











 22





 22:59 06:59 14:59


 


Intake Total   592


 


Balance   592


 


Intake:   


 


  Oral   592


 


Other:   


 


  Voiding Method   Toilet


 


  # Voids  1 


 


  Weight  122.5 kg 

















PHYSICAL EXAMINATION: 


Patient is lying in the bed comfortably, no acute distress, awake alert and 

oriented.. 


HEENT: Normocephalic. Neck is supple. Pupils reactive. Nostrils clear. Oral 

cavity is moist. 


Neck reveals no JVD, carotid bruits, or thyromegaly. 


CHEST EXAMINATION: Trachea is central. Symmetrical expansion.  Bibasilar 

diminished sounds.  Minimal expiratory wheeze.  No rhonchi or crackles.. 


CARDIAC: Normal S1, S2 with no gallops. No murmurs 


ABDOMEN: Soft. Bowel sounds present.  Nontender.  No organomegaly. No abdominal 

bruits. 


Extremities: reveal no edema.  No clubbing or cyanosis


Neurologically awake, alert, oriented x3 with well-coordinated movements.  No 

focal deficits noted


Skin: No rash or skin lesions. 


Psychiatric: Coperative.  Nonsuicidal,


Musculoskeletal: No joint swelling or deformity.  Normal range of motion.  Right

 knee surgical site is bandaged.








Results


CBC & Chem 7: 


                                 22 06:42





                                 22 06:42


Labs: 


                  Abnormal Lab Results - Last 24 Hours (Table)











  22 Range/Units





  21:41 02:23 06:42 


 


WBC    10.96 H  (4.50-10.00)  X 10*3/uL


 


MCV    98.9 H  (80.0-97.0)  fL


 


MCHC    30.0 L  (32.0-37.0)  g/dL


 


RDW    15.2 H  (11.5-14.5)  %


 


Neutrophils #    9.51 H  (1.80-7.70)  X 10*3/uL


 


Lymphocytes #    0.49 L  (0.90-5.00)  X 10*3/uL


 


Eosinophils #    0.01 L  (0.04-0.35)  X 10*3/uL


 


Sodium     (137-145)  mmol/L


 


BUN     (7-17)  mg/dL


 


Creatinine     (0.52-1.04)  mg/dL


 


Glucose     (74-99)  mg/dL


 


POC Glucose (mg/dL)  199 H  197 H   ()  mg/dL


 


Calcium     (8.4-10.2)  mg/dL


 


AST     (14-36)  U/L


 


Total Protein     (6.3-8.2)  g/dL


 


Albumin     (3.5-5.0)  g/dL














  22 Range/Units





  06:42 06:54 11:37 


 


WBC     (4.50-10.00)  X 10*3/uL


 


MCV     (80.0-97.0)  fL


 


MCHC     (32.0-37.0)  g/dL


 


RDW     (11.5-14.5)  %


 


Neutrophils #     (1.80-7.70)  X 10*3/uL


 


Lymphocytes #     (0.90-5.00)  X 10*3/uL


 


Eosinophils #     (0.04-0.35)  X 10*3/uL


 


Sodium  136 L    (137-145)  mmol/L


 


BUN  20 H    (7-17)  mg/dL


 


Creatinine  1.10 H    (0.52-1.04)  mg/dL


 


Glucose  254 H    (74-99)  mg/dL


 


POC Glucose (mg/dL)   280 H  237 H  ()  mg/dL


 


Calcium  8.2 L    (8.4-10.2)  mg/dL


 


AST  12 L    (14-36)  U/L


 


Total Protein  5.7 L    (6.3-8.2)  g/dL


 


Albumin  3.3 L    (3.5-5.0)  g/dL














Assessment and Plan


Assessment: 








S/p right total knee arthroplasty postoperative day 1


Hypotension likely due to postoperative.  Improved now.


Hypertension


Hyperlipidemia


Diabetes type 2 non-insulin-dependent with hyperglycemia


Obstructive sleep apnea not on CPAP at home currently


Asthma


Chronic hypoxic respiratory failure on 1 L oxygen via nasal cannula at home


Possible underlying COPD


Previous history of smoking


Bipolar disorder and schizoaffective disorder


Morbid obesity BMI 46.8


DVT prophylaxis currently on Xarelto





Plan:


Patient will be continued on incentive spirometry and duo nebs as needed for 

shortness of breath.


Blood pressure medications on hold today and will be reinitiated once blood 

pressure improves.


Continue with insulin sliding scale for better blood sugar control.


Pain is currently controlled well.


Follow-up CBC and BMP.  We will continue to follow and further recommendations 

based on the clinical course.





Thank you for your consult.





Time with Patient: Greater than 30

## 2022-09-08 LAB
GLUCOSE BLD-MCNC: 193 MG/DL (ref 70–110)
GLUCOSE BLD-MCNC: 227 MG/DL (ref 70–110)
GLUCOSE BLD-MCNC: 74 MG/DL (ref 70–110)
GLUCOSE BLD-MCNC: 75 MG/DL (ref 70–110)

## 2022-09-08 PROCEDURE — 0SRC0J9 REPLACEMENT OF RIGHT KNEE JOINT WITH SYNTHETIC SUBSTITUTE, CEMENTED, OPEN APPROACH: ICD-10-PCS

## 2022-09-08 PROCEDURE — 3E0T3BZ INTRODUCTION OF ANESTHETIC AGENT INTO PERIPHERAL NERVES AND PLEXI, PERCUTANEOUS APPROACH: ICD-10-PCS

## 2022-09-08 RX ADMIN — RIVAROXABAN SCH MG: 10 TABLET, FILM COATED ORAL at 08:20

## 2022-09-08 RX ADMIN — INSULIN ASPART SCH: 100 INJECTION, SOLUTION INTRAVENOUS; SUBCUTANEOUS at 17:16

## 2022-09-08 RX ADMIN — HYDROCODONE BITARTRATE AND ACETAMINOPHEN PRN EACH: 7.5; 325 TABLET ORAL at 05:34

## 2022-09-08 RX ADMIN — DOCUSATE SODIUM AND SENNOSIDES SCH EACH: 50; 8.6 TABLET ORAL at 20:43

## 2022-09-08 RX ADMIN — INSULIN ASPART SCH: 100 INJECTION, SOLUTION INTRAVENOUS; SUBCUTANEOUS at 20:48

## 2022-09-08 RX ADMIN — HYDROCODONE BITARTRATE AND ACETAMINOPHEN PRN EACH: 7.5; 325 TABLET ORAL at 10:41

## 2022-09-08 RX ADMIN — GLIMEPIRIDE SCH MG: 4 TABLET ORAL at 08:20

## 2022-09-08 RX ADMIN — INSULIN ASPART SCH UNIT: 100 INJECTION, SOLUTION INTRAVENOUS; SUBCUTANEOUS at 08:20

## 2022-09-08 RX ADMIN — ATORVASTATIN CALCIUM SCH MG: 40 TABLET, FILM COATED ORAL at 20:43

## 2022-09-08 RX ADMIN — INSULIN ASPART SCH UNIT: 100 INJECTION, SOLUTION INTRAVENOUS; SUBCUTANEOUS at 12:25

## 2022-09-08 RX ADMIN — METOPROLOL TARTRATE SCH MG: 25 TABLET, FILM COATED ORAL at 08:20

## 2022-09-08 NOTE — P.PN
Subjective


Progress Note Date: 09/08/22


Principal diagnosis: 





Status post right total knee arthroplasty





Patient was evaluated today at bedside, she is resting in her hospital chair.  

Patient has continued to struggle and require a lot of assistance when 

ambulating.  She states that she is not getting a full 6 hours out of the pain 

medication..  Currently denies any chest pain.  She's been urinating with no 

difficulties.





Objective





- Vital Signs


Vital signs: 


                                   Vital Signs











Temp  99.0 F   09/08/22 08:00


 


Pulse  115 H  09/08/22 08:00


 


Resp  18   09/08/22 08:00


 


BP  127/71   09/08/22 08:00


 


Pulse Ox  92 L  09/08/22 08:00


 


FiO2      








                                 Intake & Output











 09/07/22 09/08/22 09/08/22





 18:59 06:59 18:59


 


Intake Total 888  


 


Balance 888  


 


Intake:   


 


  Oral 888  


 


Other:   


 


  Voiding Method Toilet Bedside Commode 


 


  # Voids 2 1 














- Exam





Right lower extremity:





Incision is clean, dry, and intact.  The exofin fusion tape is in good 

condition.  There is minimal soft tissue swelling and ecchymosis surrounding the

medial and lateral aspects of the incision.  Calf is soft, no tenderness with 

palpation.  Plantar flexion, dorsiflexion, EHL, FHL are intact.  Sensory exam to

light touch throughout the extremity is intact, dorsal pedis pulses 2+.








- Labs


CBC & Chem 7: 


                                 09/07/22 06:42





                                 09/07/22 06:42


Labs: 


                  Abnormal Lab Results - Last 24 Hours (Table)











  09/07/22 09/07/22 09/07/22 Range/Units





  06:42 11:37 16:57 


 


Sodium  136 L    (137-145)  mmol/L


 


BUN  20 H    (7-17)  mg/dL


 


Creatinine  1.10 H    (0.52-1.04)  mg/dL


 


Glucose  254 H    (74-99)  mg/dL


 


POC Glucose (mg/dL)   237 H  138 H  ()  mg/dL


 


Calcium  8.2 L    (8.4-10.2)  mg/dL


 


AST  12 L    (14-36)  U/L


 


Total Protein  5.7 L    (6.3-8.2)  g/dL


 


Albumin  3.3 L    (3.5-5.0)  g/dL














  09/07/22 09/08/22 Range/Units





  20:40 07:12 


 


Sodium    (137-145)  mmol/L


 


BUN    (7-17)  mg/dL


 


Creatinine    (0.52-1.04)  mg/dL


 


Glucose    (74-99)  mg/dL


 


POC Glucose (mg/dL)  227 H  193 H  ()  mg/dL


 


Calcium    (8.4-10.2)  mg/dL


 


AST    (14-36)  U/L


 


Total Protein    (6.3-8.2)  g/dL


 


Albumin    (3.5-5.0)  g/dL














Assessment and Plan


Assessment: 





Postoperative day #2 status post right total knee arthroplasty


Plan: 





Pain control, did change Norco 7.5 mg 2 every 4 hours





DVT prophylaxis, continue Xarelto during hospital stay





Encourage incentive spirometer





Icing and elevating techniques discussed





Continue PT/OT, weight-bear as tolerated with walker





Medical recommendations





Discharge planning: Spoke again with the patient today regarding her current 

discharge plans.  We again discussed the possibility of subacute rehab.  I feel 

that the patient would benefit from subacute rehab placement.  Discussed with 

nursing and case management to begin the prior authorization process.  We will 

reassess on 09/09/2022.








Time with Patient: Less than 30

## 2022-09-09 VITALS
SYSTOLIC BLOOD PRESSURE: 117 MMHG | RESPIRATION RATE: 16 BRPM | TEMPERATURE: 98.9 F | DIASTOLIC BLOOD PRESSURE: 77 MMHG | HEART RATE: 87 BPM

## 2022-09-09 LAB
GLUCOSE BLD-MCNC: 100 MG/DL (ref 70–110)
GLUCOSE BLD-MCNC: 125 MG/DL (ref 70–110)
GLUCOSE BLD-MCNC: 81 MG/DL (ref 70–110)

## 2022-09-09 RX ADMIN — INSULIN ASPART SCH: 100 INJECTION, SOLUTION INTRAVENOUS; SUBCUTANEOUS at 07:26

## 2022-09-09 RX ADMIN — GLIMEPIRIDE SCH MG: 4 TABLET ORAL at 08:16

## 2022-09-09 RX ADMIN — METOPROLOL TARTRATE SCH MG: 25 TABLET, FILM COATED ORAL at 08:16

## 2022-09-09 RX ADMIN — INSULIN ASPART SCH: 100 INJECTION, SOLUTION INTRAVENOUS; SUBCUTANEOUS at 12:47

## 2022-09-09 RX ADMIN — RIVAROXABAN SCH MG: 10 TABLET, FILM COATED ORAL at 08:16

## 2022-09-09 NOTE — P.PN
Subjective


Progress Note Date: 09/09/22








- Reason for Consult


Consult date: 09/07/22


Medical management





- Chief Complaint


Status post right total knee arthroplasty





- History of Present Illness





Patient is a 62-year-old female with a known history of hypertension, 

hyperlipidemia, osteoarthritis, obstructive sleep apnea not on CPAP, asthma/COPD

on home oxygen 1 L via nasal cannula and bipolar disorder, schizoaffective 

disorder and previous history of smoking was admitted to the hospital for 

elective right total knee arthroplasty.  Patient has been having severe right 

knee pain and x-ray showed tricompartmental osteoarthrosis.  Patient tolerated 

the procedure very well.  Currently patient is on  adductor canal catheter for 

pain control.


Patient denied any complaints of chest pain or shortness of breath.  No nausea 

vomiting abdominal pain or diarrhea.


No cough or sputum production.  No headache or dizziness or lightheadedness.


Postoperatively patient was hypotensive with blood pressure down to 96/56.


Laboratory test showed WBC 10.9 hemoglobin 13.4 and platelets 269


Sodium 136 potassium 5.0 chloride 98 bicarb is 29 BUN 20 and creatinine 1.1 and 

blood sugar is 254 albumin 3.3











9/8/2022





Patient is seen in follow up this morning and currently attempting to work with 

physical therapy and almost maximum assistance. Orthopedics following and right 

knee dressing appears dry and intact with an ice pack on it currently. Patient 

is reporting pain and encouraged the use of pain medications as needed and per 

nursing staff patient had been refusing the medications. Had a lengthy 

discussion about her overall progress and safety risk going home in this 

condition and her high risk of falls. Patient reluctant of rehab and reports she

will be going home. Encouraged the patient to talk with case management and 

consider rehab for some continued PT/OT therapy short term to gain strength and 

mobility. Patient is tearful on exam. Patient reports some shortness of breath 

and had been off oxygen during the transfer to the chair that took over 20 m

inutes. Oxygen at 4L via NC reapplied. Encouraged incentive spirometer and had 

to educate the patient on how to properly use the device. Encouraged using it at

least 10 times per hour while awake. Patient is afebrile and denies chest pain 

or palpitations. No reports of nausea or vomiting noted and tolerating diet. 





9/9/2022





Patient is seen today in follow up post right knee arthroplasty and post op day 

3. Patient reports pain is controlled today and had better session with PT 

today. Recommending [facility] for continued PT/OT therapy. Patient is now agreeable. 

Recommend to resume home meds and follow up with pcp on discharge. Patient is 

afebrile and denies chest pain or shortness of breath. Patient reports to 

passing gas but no BM yet. Probable medilodge today. 





Review of systems:


Constitutional: No reports of fatigue, fever, or chills


Cardiovascular: No reports of chest pain or palpitations


Respiratory: No reports of shortness of breath or cough


GI: No reports of nausea, vomiting, or diarrhea, patient reports passing gas 


: No reports of dysuria or retention


Neurovascular:  reports of weakness and right knee pain although improved today





All medications have been reviewed








PHYSICAL EXAMINATION: 


Patient is sitting up in the chair, no acute distress, awake alert and 

oriented.. 


HEENT: Normocephalic. Neck is supple. Pupils reactive. Nostrils clear. Oral 

cavity is moist. 


Neck reveals no JVD, carotid bruits, or thyromegaly. 


CHEST EXAMINATION: Trachea is central. Symmetrical expansion.  Bibasilar 

diminished sounds.  Minimal expiratory wheeze.  No rhonchi or crackles.. 


CARDIAC: Normal S1, S2 with no gallops. No murmurs 


ABDOMEN: Soft. Bowel sounds present.  Nontender.  No organomegaly. No abdominal 

bruits. 


Extremities: reveal no edema.  No clubbing or cyanosis


Neurologically awake, alert, oriented x3 with well-coordinated movements.  No 

focal deficits noted


Skin: No rash or skin lesions. 


Psychiatric: Cooperative.  Non-suicidal,


Musculoskeletal: No joint swelling or deformity.  Normal range of motion.  Right

knee surgical site is bandaged and ice pack applied.





Assessment:





S/p right total knee arthroplasty postoperative day 3


Hypotension likely due to postoperative.  Improved now.


Hypertension history


Hyperlipidemia


Diabetes type 2 non-insulin-dependent with hyperglycemia


Obstructive sleep apnea not on CPAP at home currently


Asthma


Chronic hypoxic respiratory failure on 4 L oxygen via nasal cannula at home


Possible underlying COPD


Previous history of smoking


Bipolar disorder and schizoaffective disorder


Morbid obesity BMI 46.8


DVT prophylaxis currently on Xarelto





Plan:





Patient will be continued on incentive spirometry and duo nebs as needed for 

shortness of breath.  


Encouraged incentive spirometer use at least 10 times every hour while awake and

to continue outpatient


Blood pressure medications  will be reinitiated once blood pressure improves.


Continue with insulin sliding scale for better blood sugar control.


Encouraged to increase activity as tolerated and encouraged oral intake


Pain management per orthopedic services and educated the patient that pain 

medications are as needed and not scheduled


We will continue to follow and further recommendations based on the clinical 

course.


Patient is now agreeable and will be going to [facility]. Patient 

will be discharged today.





We will continue to follow during hospitalization with orthopedics.  Thank you 

for this consultation.





The impression and plan of care has been dictated by Lorraine Aleman, Nurse 

Practitioner as directed.





Dr. Yovani MD


I have performed a history and examination and MDM of this patient, discussed 

the same with the dictator, and  agree with the dictator's assessment and plan 

as written ,documented as a scribe. Based on total visit time,  I have performed

more than 50% of the visit.  








Objective





- Vital Signs


Vital signs: 


                                   Vital Signs











Temp  98.2 F   09/09/22 08:00


 


Pulse  114 H  09/09/22 08:00


 


Resp  18   09/09/22 08:00


 


BP  118/73   09/09/22 08:00


 


Pulse Ox  91 L  09/09/22 08:00


 


FiO2      








                                 Intake & Output











 09/08/22 09/09/22 09/09/22





 18:59 06:59 18:59


 


Output Total 3  


 


Balance -3  


 


Output:   


 


  Stool 3  


 


Other:   


 


  Voiding Method  Bedside Commode 


 


  # Voids  2 














- Labs


CBC & Chem 7: 


                                 09/07/22 06:42





                                 09/07/22 06:42


Labs: 


                  Abnormal Lab Results - Last 24 Hours (Table)











  09/08/22 Range/Units





  11:49 


 


POC Glucose (mg/dL)  227 H  ()  mg/dL

## 2022-09-09 NOTE — P.PN
Subjective


Progress Note Date: 09/08/22








- Reason for Consult


Consult date: 09/07/22


Medical management





- Chief Complaint


Status post right total knee arthroplasty





- History of Present Illness





Patient is a 62-year-old female with a known history of hypertension, 

hyperlipidemia, osteoarthritis, obstructive sleep apnea not on CPAP, asthma/COPD

on home oxygen 1 L via nasal cannula and bipolar disorder, schizoaffective 

disorder and previous history of smoking was admitted to the hospital for 

elective right total knee arthroplasty.  Patient has been having severe right 

knee pain and x-ray showed tricompartmental osteoarthrosis.  Patient tolerated 

the procedure very well.  Currently patient is on  adductor canal catheter for 

pain control.


Patient denied any complaints of chest pain or shortness of breath.  No nausea 

vomiting abdominal pain or diarrhea.


No cough or sputum production.  No headache or dizziness or lightheadedness.


Postoperatively patient was hypotensive with blood pressure down to 96/56.


Laboratory test showed WBC 10.9 hemoglobin 13.4 and platelets 269


Sodium 136 potassium 5.0 chloride 98 bicarb is 29 BUN 20 and creatinine 1.1 and 

blood sugar is 254 albumin 3.3











9/8/2022





Patient is seen in follow up this morning and currently attempting to work with 

physical therapy and almost maximum assistance. Orthopedics following and right 

knee dressing appears dry and intact with an ice pack on it currently. Patient 

is reporting pain and encouraged the use of pain medications as needed and per 

nursing staff patient had been refusing the medications. Had a lengthy 

discussion about her overall progress and safety risk going home in this 

condition and her high risk of falls. Patient reluctant of rehab and reports she

will be going home. Encouraged the patient to talk with case management and 

consider rehab for some continued PT/OT therapy short term to gain strength and 

mobility. Patient is tearful on exam. Patient reports some shortness of breath 

and had been off oxygen during the transfer to the chair that took over 20 m

inutes. Oxygen at 4L via NC reapplied. Encouraged incentive spirometer and had 

to educate the patient on how to properly use the device. Encouraged using it at

least 10 times per hour while awake. Patient is afebrile and denies chest pain 

or palpitations. No reports of nausea or vomiting noted and tolerating diet. 





Review of systems:


Constitutional: No reports of fatigue, fever, or chills


Cardiovascular: No reports of chest pain or palpitations


Respiratory: No reports of shortness of breath or cough


GI: No reports of nausea, vomiting, or diarrhea, patient reports not passing gas

or having bowel movements


: No reports of dysuria or retention


Neurovascular:  reports of weakness and right knee pain





All medications have been reviewed








PHYSICAL EXAMINATION: 


Patient is sitting up in the chair, no acute distress, awake alert and 

oriented.. Somewhat delayed and tearful and reports pain due to recent activity 

with physical therapy


HEENT: Normocephalic. Neck is supple. Pupils reactive. Nostrils clear. Oral 

cavity is moist. 


Neck reveals no JVD, carotid bruits, or thyromegaly. 


CHEST EXAMINATION: Trachea is central. Symmetrical expansion.  Bibasilar 

diminished sounds.  Minimal expiratory wheeze.  No rhonchi or crackles.. 


CARDIAC: Normal S1, S2 with no gallops. No murmurs 


ABDOMEN: Soft. Bowel sounds present.  Nontender.  No organomegaly. No abdominal 

bruits. 


Extremities: reveal no edema.  No clubbing or cyanosis


Neurologically awake, alert, oriented x3 with well-coordinated movements.  No 

focal deficits noted


Skin: No rash or skin lesions. 


Psychiatric: Cooperative.  Non-suicidal,


Musculoskeletal: No joint swelling or deformity.  Normal range of motion.  Right

knee surgical site is bandaged and ice pack applied.





Assessment:





S/p right total knee arthroplasty postoperative day 2


Hypotension likely due to postoperative.  Improved now.


Hypertension history


Hyperlipidemia


Diabetes type 2 non-insulin-dependent with hyperglycemia


Obstructive sleep apnea not on CPAP at home currently


Asthma


Chronic hypoxic respiratory failure on 4 L oxygen via nasal cannula at home


Possible underlying COPD


Previous history of smoking


Bipolar disorder and schizoaffective disorder


Morbid obesity BMI 46.8


DVT prophylaxis currently on Xarelto





Plan:





Patient will be continued on incentive spirometry and duo nebs as needed for 

shortness of breath.  Patient was found with no oxygen on as patient was working

with physical therapy and reporting shortness of breath and 4 L placed


Encouraged incentive spirometer use at least 10 times every hour while awake and

patient needed instruction on how to use she was not using it correctly.


Blood pressure medications on hold today and will be reinitiated once blood 

pressure improves.


Continue with insulin sliding scale for better blood sugar control.


Encouraged to increase activity as tolerated and encouraged oral intake


Pain management per orthopedic services and educated the patient that pain 

medications are as needed and not scheduled


We will continue to follow and further recommendations based on the clinical 

course.


Patient is also extremely weak almost maximum assist during physical therapy 

evaluation today that was personally witnessed by writer and recommending rehab 

although patient initially was reluctant and had a discussion about safety and 

going to rehab for short-term to continue strength and mobility and patient will

think about it.  Case management is following.





We will continue to follow during hospitalization with orthopedics.  Thank you 

for this consultation.





The impression and plan of care has been dictated by Lorraine Aleman, Nurse 

Practitioner as directed.





Dr. Yovani MD


I have performed a history and examination and MDM of this patient, discussed 

the same with the dictator, and  agree with the dictator's assessment and plan 

as written ,documented as a scribe. Based on total visit time,  I have performed

more than 50% of the visit.  








Objective





- Vital Signs


Vital signs: 


                                   Vital Signs











Temp  99.0 F   09/08/22 08:00


 


Pulse  115 H  09/08/22 08:00


 


Resp  18   09/08/22 08:00


 


BP  127/71   09/08/22 08:00


 


Pulse Ox  92 L  09/08/22 08:00


 


FiO2      








                                 Intake & Output











 09/07/22 09/08/22 09/08/22





 18:59 06:59 18:59


 


Intake Total 888  


 


Balance 888  


 


Intake:   


 


  Oral 888  


 


Other:   


 


  Voiding Method Toilet Bedside Commode 


 


  # Voids 2 1 














- Labs


CBC & Chem 7: 


                                 09/07/22 06:42





                                 09/07/22 06:42


Labs: 


                  Abnormal Lab Results - Last 24 Hours (Table)











  09/07/22 09/07/22 09/07/22 Range/Units





  06:42 06:42 11:37 


 


WBC  10.96 H    (4.50-10.00)  X 10*3/uL


 


MCV  98.9 H    (80.0-97.0)  fL


 


MCHC  30.0 L    (32.0-37.0)  g/dL


 


RDW  15.2 H    (11.5-14.5)  %


 


Neutrophils #  9.51 H    (1.80-7.70)  X 10*3/uL


 


Lymphocytes #  0.49 L    (0.90-5.00)  X 10*3/uL


 


Eosinophils #  0.01 L    (0.04-0.35)  X 10*3/uL


 


Sodium   136 L   (137-145)  mmol/L


 


BUN   20 H   (7-17)  mg/dL


 


Creatinine   1.10 H   (0.52-1.04)  mg/dL


 


Glucose   254 H   (74-99)  mg/dL


 


POC Glucose (mg/dL)    237 H  ()  mg/dL


 


Calcium   8.2 L   (8.4-10.2)  mg/dL


 


AST   12 L   (14-36)  U/L


 


Total Protein   5.7 L   (6.3-8.2)  g/dL


 


Albumin   3.3 L   (3.5-5.0)  g/dL














  09/07/22 09/07/22 09/08/22 Range/Units





  16:57 20:40 07:12 


 


WBC     (4.50-10.00)  X 10*3/uL


 


MCV     (80.0-97.0)  fL


 


MCHC     (32.0-37.0)  g/dL


 


RDW     (11.5-14.5)  %


 


Neutrophils #     (1.80-7.70)  X 10*3/uL


 


Lymphocytes #     (0.90-5.00)  X 10*3/uL


 


Eosinophils #     (0.04-0.35)  X 10*3/uL


 


Sodium     (137-145)  mmol/L


 


BUN     (7-17)  mg/dL


 


Creatinine     (0.52-1.04)  mg/dL


 


Glucose     (74-99)  mg/dL


 


POC Glucose (mg/dL)  138 H  227 H  193 H  ()  mg/dL


 


Calcium     (8.4-10.2)  mg/dL


 


AST     (14-36)  U/L


 


Total Protein     (6.3-8.2)  g/dL


 


Albumin     (3.5-5.0)  g/dL

## 2022-09-09 NOTE — P.DS
Providers


Date of admission: 


09/08/22 11:45





Expected date of discharge: 09/09/22


Attending physician: 


Juan Pablo Jimenez





Consults: 





                                        





09/06/22 12:04


Consult Physician Routine 


   Consulting Provider: Carolee Pascual


   Consult Reason/Comments: medical management


   Do you want consulting provider notified?: Yes











Primary care physician: 


Reta Felton





Hospital Course: 


Date of admission: 09/06/2022


Date of discharge: 09/09/2022





Admission diagnosis: Right knee osteoarthritis


Discharge diagnosis: Same





Attending physician: Dr. Jimenez





Surgical procedures: Right total knee arthroplasty





Brief history: Patient is a 62-year-old female with a history of progressive 

primary right knee osteoarthritis.  At this point patient has failed 

conservative treatment measures and has opted to proceed with a elective right 

total knee arthroplasty.





Hospital course: Details of patient's surgery can be found in operative report. 

Patient tolerated the procedure well and was subsequently transported to 

orthopedic floor.  Patient's orthopeidc and medical care was provided daily. 

Patient had daily laboratory tests performed for evaluation of overall blood 

counts.  Patient had daily physical therapy to include strengthening range of 

motion as well as education with walker ambulation. Patient was treated with 

Xarelto for their postoperative DVT prophylaxis during their inpatient stay.  

Patient was noted to have a relatively uneventful postoperative course.  Patient

reported satisfactory pain control with oral pain medications by postoperative 

day 3.  Patient showed satisfactory progress with physical therapy.  Patient 

moved steadily through the program and had no difficulty meeting the goals by 

postoperative day 3.  Given patient's otherwise satisfactory course and having 

met physical therapy goals, plan is to discharge patient to rehab on 

postoperative day 3.





Discharge condition/disposition: Patient will be discharged to rehab in stable 

condition.





Discharge medications: Instructions are given on resumption of patient's normal 

daily medications per primary care recommendation, in addition patient will be 

prescribed Norco 7.5 mg/325 mg; Colace; Eliqusi 2.5 mg BID x 2 weeks.





Discharge instructions:


1.  Wound care and infection precautions, keep incision dry and covered while 

showering, no lotions, creams, moisturizers. No soaking, tubs, pools, hottubs. 

Do not scrub over the incision.


2.  Weight-bear as tolerated with walker / cane until follow-up.


3.  Ice and elevate when necessary.  Do not exceed 20 minutes per hour with ice 

pack.


4.  Utilize compression sleeve until seen at first follow up appointment.


5.  Visiting nursing care.


6.  Home physical therapy including home CPM.


7.  Pain meds and anticoagulants per prescription.


8.  Pain medication has potential to cause constipation. Increase oral fluid and

fiber intake. Contact primary care provider if you have not had a bowel movement

within 48 hours after discharge


9.  No anti-inflammatory medication until discussed at first post operative 

visit, this including Motrin, Aleve, Mobic, Diclofenac, Aspirin.


10.  Follow up in office at 2 weeks postop with Dennys Jang PA-C / Binh Noble PA-C


11. Follow up with your primary care doctor 7-10 days after discharge.


12. Contact [facility] with any questions, [phone].





Keep incision clean, dry, intact.  While showering, cover fusion tape with Saran

wrap.  Keep fusion tape on until follow-up appointment in office in 2 weeks





Medications: Norco 7.5 mg/325 mg; Colace; Eliqusi 2.5 mg BID x 2 weeks.











Assessment: 


Right knee osteoarthritis


Procedures: 





Right total knee arthroplasty


Patient Condition at Discharge: Good





Plan - Discharge Summary


Discharge Rx Participant: No


New Discharge Prescriptions: 


New


   Docusate [Colace] 100 mg PO DAILY #30 capsule


   HYDROcodone/APAP 7.5-325MG [Norco 7.5] 1 - 2 each PO Q6HR PRN #36 tab


     PRN Reason: Pain


   Apixaban [Eliquis] 2.5 mg PO BID #60 tab





Continue


   Glimepiride [Amaryl] 4 mg PO AC-BRKFST


   Metoprolol Tartrate [Lopressor] 25 mg PO QAM


   Rosuvastatin [Crestor] 20 mg PO HS


   Losartan Potassium [Cozaar] 25 mg PO QAM


   ARIPiprazole [Abilify] 10 mg PO HS


Discharge Medication List





Glimepiride [Amaryl] 4 mg PO AC-BRKFST 08/28/20 [History]


ARIPiprazole [Abilify] 10 mg PO HS 09/01/22 [History]


Losartan Potassium [Cozaar] 25 mg PO QAM 09/01/22 [History]


Metoprolol Tartrate [Lopressor] 25 mg PO QAM 09/01/22 [History]


Rosuvastatin [Crestor] 20 mg PO HS 09/01/22 [History]


Apixaban [Eliquis] 2.5 mg PO BID #60 tab 09/09/22 [Rx]


Docusate [Colace] 100 mg PO DAILY #30 capsule 09/09/22 [Rx]


HYDROcodone/APAP 7.5-325MG [Norco 7.5] 1 - 2 each PO Q6HR PRN #36 tab 09/09/22 

[Rx]








Follow up Appointment(s)/Referral(s): 


Binh Noble, VELMA [PHYSICIAN ASSISTANT] - 2 Weeks


[facility] [NON-STAFF] - As Needed


[facility] [NON-STAFF] - As Needed (Continous Passive Motion knee 

machine)


Activity/Diet/Wound Care/Special Instructions: 


Discharge instructions:


1.  Wound care and infection precautions, keep incision dry and covered while 

showering, no lotions, creams, moisturizers. No soaking, tubs, pools, hottubs. 

Do not scrub over the incision.


2.  Weight-bear as tolerated with walker / cane until follow-up.


3.  Ice and elevate when necessary.  Do not exceed 20 minutes per hour with ice 

pack.


4.  Utilize compression sleeve until seen at first follow up appointment.


5.  Visiting nursing care.


6.  Home physical therapy including home CPM.


7.  Pain meds and anticoagulants per prescription.


8.  Pain medication has potential to cause constipation. Increase oral fluid and

fiber intake. Contact primary care provider if you have not had a bowel movement

within 48 hours after discharge


9.  No anti-inflammatory medication until discussed at first post operative 

visit, this including Motrin, Aleve, Mobic, Diclofenac, Aspirin.


10.  Follow up in office at 2 weeks postop with Dennys Jang PA-C / Binh Noble PA-C


11. Follow up with your primary care doctor 7-10 days after discharge.


12. Contact [facility] with any questions, [phone].





Keep incision clean, dry, intact.  While showering, cover fusion tape with Saran

wrap.  Keep fusion tape on until follow-up appointment in office in 2 weeks





Medications: Norco 7.5 mg/325 mg; Colace; Eliquis 2.5 mg BID x 2 weeks.





Discharge Disposition: TRANSFER TO SNF/ECF

## 2022-09-09 NOTE — P.PN
Subjective


Progress Note Date: 09/09/22


Principal diagnosis: 


Right knee osteoarthritis





Patient was seen at bedside this afternoon resting cuff was sitting up in chair 

with right leg elevated.  Patient son was present throughout the encounter.  

Patient says she is feeling much better today.  Patient says she was able to get

up under own power from bed and sit in chair.  Patient says she is feeling like 

she is seeing improvement over the past couple days with physical therapy.  

Patient says she has urinated since surgery.  Patient says she has not had bowel

movement yet, however, patient says she has passed gas.  Patient says she does 

have a walker for home.  Patient denies chest pain, fever, shortness of breath, 

nausea, vomiting, change in vision, loss of bowel/bladder control.








Objective





- Vital Signs


Vital signs: 


                                   Vital Signs











Temp  98.2 F   09/09/22 08:00


 


Pulse  114 H  09/09/22 08:00


 


Resp  20   09/09/22 08:16


 


BP  118/73   09/09/22 08:00


 


Pulse Ox  91 L  09/09/22 08:00


 


FiO2      








                                 Intake & Output











 09/08/22 09/09/22 09/09/22





 18:59 06:59 18:59


 


Output Total 3  


 


Balance -3  


 


Output:   


 


  Stool 3  


 


Other:   


 


  Voiding Method  Bedside Commode Bedside Commode


 


  # Voids  2 














- Exam


Right knee:


Incision is clean, dry, and intact.  The exofin fusion tape is in good conditio

n.  There is minimal soft tissue swelling and ecchymosis surrounding the medial 

and lateral aspects of the incision.  Calf is soft, no tenderness with 

palpation.  Plantar flexion, dorsiflexion, EHL, FHL are intact.  Sensory exam to

light touch throughout the extremity is intact, dorsal pedis pulses 2+.








- Labs


CBC & Chem 7: 


                                 09/07/22 06:42





                                 09/07/22 06:42


Labs: 


                  Abnormal Lab Results - Last 24 Hours (Table)











  09/09/22 Range/Units





  11:49 


 


POC Glucose (mg/dL)  125 H  ()  mg/dL














Assessment and Plan


Assessment: 


1.  Right knee osteoarthritis





- Postoperative day #3 status post right total knee arthroplasty





Plan: 


1.  Right knee osteoarthritis - right total knee arthroplasty performed Tuesday,

09/06/2022.  Patient stable at bedside this afternoon.  Discharge to [facility] today. 





2.  Appreciate medical management





3.  Pain management - Norco





4.  DVT prophylaxis - Xarelto in hospital. Going to rehab with Eliquis





5. GI ppx - Colace





6.  PT/OT - weightbearing as tolerated with walker





7.  Encourage incentive spirometer use





8.  Discharge planning - discharge to [facility] today





Time with Patient: Less than 30

## 2023-01-17 ENCOUNTER — HOSPITAL ENCOUNTER (OUTPATIENT)
Dept: HOSPITAL 47 - RADCTMAIN | Age: 63
Discharge: HOME | End: 2023-01-17
Attending: UROLOGY
Payer: MEDICARE

## 2023-01-17 DIAGNOSIS — Z90.5: ICD-10-CM

## 2023-01-17 DIAGNOSIS — C64.1: Primary | ICD-10-CM

## 2023-01-17 DIAGNOSIS — J98.11: ICD-10-CM

## 2023-01-17 PROCEDURE — 74176 CT ABD & PELVIS W/O CONTRAST: CPT

## 2023-01-17 PROCEDURE — 71046 X-RAY EXAM CHEST 2 VIEWS: CPT

## 2023-01-17 NOTE — XR
EXAMINATION TYPE: XR chest 2V

 

DATE OF EXAM: 1/17/2023 4:51 PM

 

COMPARISON: Chest radiographs from 5/20/2022

 

TECHNIQUE: XR chest 2V Frontal and lateral views of the chest.

 

CLINICAL INDICATION:Female, 62 years old with history of C64.1 MALIGNANT NEOPLASM OF RIGHT KIDNEY, EX
CEPT R; 

 

FINDINGS: 

Lungs/Pleura: Low lung volumes are present. There is no evidence of pleural effusion, focal consolida
tion, or pneumothorax.  

Pulmonary vascularity: Unremarkable.

Heart/mediastinum: Cardiomediastinal silhouette is unremarkable.

Musculoskeletal: No acute osseous pathology.

 

IMPRESSION: 

Bibasilar atelectasis without acute cardiopulmonary disease/process.

## 2023-01-18 NOTE — CT
EXAMINATION TYPE: CT abdomen pelvis wo con

CT DLP: 1432.2 mGycm, Automated exposure control for dose reduction was used.

 

DATE OF EXAM: 1/17/2023 4:49 PM

 

COMPARISON: 5/20/2022

 

CLINICAL INDICATION:Female, 62 years old with history of C64.1 MALIGNANT NEOPLASM OF RIGHT KIDNEY, EX
CEPT R; f/u renal ca

 

TECHNIQUE:  Axial CT of the abdomen and pelvis. Sagittal and coronal reformats were created on a [employer] workstation. 

 

Contrast used: None

Oral contrast used: without Oral Contrast 

 

FINDINGS: 

LOWER CHEST: Streaky atelectasis/scarring in the lung bases.

 

ABDOMEN

LIVER: Unremarkable

GALLBLADDER AND BILE DUCTS: Gallbladder surgically absent.

PANCREAS: Unremarkable.

SPLEEN: Unremarkable.

ADRENAL GLANDS: Unremarkable.

KIDNEYS AND URETERS: The right kidney is surgically absent. No abnormal soft tissue in the surgical b
ed. Imaging findings are similar to 5/20/2022.

 

PELVIS

BLADDER: Unremarkable

REPRODUCTIVE: Unremarkable.

 

ABDOMEN & PELVIS

STOMACH AND BOWEL: Small hiatal hernia. No evidence of bowel obstruction. 

PERITONEUM/RETROPERITONEUM: No evidence of pneumoperitoneum or free fluid.

.

VASCULATURE: No evidence of aortic aneurysm. Atherosclerosis of the arterial vasculature.

MUSCULOSKELETAL: No acute osseous abnormalities

LYMPH NODES: No gross evidence for lymphadenopathy.

SOFT TISSUE/ABDOMINAL WALL: Fat-containing umbilical hernia. Post surgical changes anterior abdominal
 wall.

 

IMPRESSION:

Right nephrectomy changes without evidence for local recurrence or lymphadenopathy to suggest metasta
tic disease.